# Patient Record
Sex: FEMALE | Race: WHITE | Employment: FULL TIME | URBAN - METROPOLITAN AREA
[De-identification: names, ages, dates, MRNs, and addresses within clinical notes are randomized per-mention and may not be internally consistent; named-entity substitution may affect disease eponyms.]

---

## 2017-01-06 LAB
HBSAG, EXTERNAL: NEGATIVE
HIV, EXTERNAL: NON REACTIVE
RUBELLA, EXTERNAL: NORMAL
TYPE, ABO & RH, EXTERNAL: NORMAL

## 2017-05-22 LAB
ANTIBODY SCREEN, EXTERNAL: NEGATIVE
T. PALLIDUM, EXTERNAL: NEGATIVE

## 2017-07-13 LAB — GRBS, EXTERNAL: NEGATIVE

## 2017-08-08 ENCOUNTER — ANESTHESIA (OUTPATIENT)
Dept: LABOR AND DELIVERY | Age: 42
End: 2017-08-08
Payer: COMMERCIAL

## 2017-08-08 ENCOUNTER — HOSPITAL ENCOUNTER (INPATIENT)
Age: 42
LOS: 3 days | Discharge: HOME | End: 2017-08-11
Attending: OBSTETRICS & GYNECOLOGY | Admitting: OBSTETRICS & GYNECOLOGY
Payer: COMMERCIAL

## 2017-08-08 ENCOUNTER — ANESTHESIA EVENT (OUTPATIENT)
Dept: LABOR AND DELIVERY | Age: 42
End: 2017-08-08
Payer: COMMERCIAL

## 2017-08-08 PROBLEM — O09.529 HIGH-RISK PREGNANCY IN MULTIGRAVIDA GREATER THAN 35 YEARS OF AGE, ANTEPARTUM: Status: ACTIVE | Noted: 2017-08-08

## 2017-08-08 LAB
ABO + RH BLD: NORMAL
BLOOD GROUP ANTIBODIES SERPL: NORMAL
ERYTHROCYTE [DISTWIDTH] IN BLOOD BY AUTOMATED COUNT: 12.7 % (ref 11.5–14.5)
HCT VFR BLD AUTO: 31.7 % (ref 35–47)
HGB BLD-MCNC: 11.4 G/DL (ref 11.5–16)
MCH RBC QN AUTO: 30.9 PG (ref 26–34)
MCHC RBC AUTO-ENTMCNC: 36 G/DL (ref 30–36.5)
MCV RBC AUTO: 85.9 FL (ref 80–99)
PLATELET # BLD AUTO: 232 K/UL (ref 150–400)
RBC # BLD AUTO: 3.69 M/UL (ref 3.8–5.2)
SPECIMEN EXP DATE BLD: NORMAL
WBC # BLD AUTO: 4.2 K/UL (ref 3.6–11)

## 2017-08-08 PROCEDURE — 36415 COLL VENOUS BLD VENIPUNCTURE: CPT | Performed by: OBSTETRICS & GYNECOLOGY

## 2017-08-08 PROCEDURE — 74011000250 HC RX REV CODE- 250: Performed by: ANESTHESIOLOGY

## 2017-08-08 PROCEDURE — 74011000250 HC RX REV CODE- 250: Performed by: OBSTETRICS & GYNECOLOGY

## 2017-08-08 PROCEDURE — 3E0R3CZ INTRODUCE REGIONAL ANESTH IN SPINAL CANAL, PERC: ICD-10-PCS | Performed by: ANESTHESIOLOGY

## 2017-08-08 PROCEDURE — 76060000078 HC EPIDURAL ANESTHESIA: Performed by: OBSTETRICS & GYNECOLOGY

## 2017-08-08 PROCEDURE — 76010000392 HC C SECN EA ADDL 0.5 HR: Performed by: OBSTETRICS & GYNECOLOGY

## 2017-08-08 PROCEDURE — 86900 BLOOD TYPING SEROLOGIC ABO: CPT | Performed by: OBSTETRICS & GYNECOLOGY

## 2017-08-08 PROCEDURE — 74011250636 HC RX REV CODE- 250/636: Performed by: ANESTHESIOLOGY

## 2017-08-08 PROCEDURE — 77030012890

## 2017-08-08 PROCEDURE — 74011250636 HC RX REV CODE- 250/636: Performed by: OBSTETRICS & GYNECOLOGY

## 2017-08-08 PROCEDURE — 85027 COMPLETE CBC AUTOMATED: CPT | Performed by: OBSTETRICS & GYNECOLOGY

## 2017-08-08 PROCEDURE — 10907ZC DRAINAGE OF AMNIOTIC FLUID, THERAPEUTIC FROM PRODUCTS OF CONCEPTION, VIA NATURAL OR ARTIFICIAL OPENING: ICD-10-PCS | Performed by: OBSTETRICS & GYNECOLOGY

## 2017-08-08 PROCEDURE — 75410000003 HC RECOV DEL/VAG/CSECN EA 0.5 HR: Performed by: OBSTETRICS & GYNECOLOGY

## 2017-08-08 PROCEDURE — 77030020061 HC IV BLD WRMR ADMIN SET 3M -B: Performed by: ANESTHESIOLOGY

## 2017-08-08 PROCEDURE — 77030034849

## 2017-08-08 PROCEDURE — 00HU33Z INSERTION OF INFUSION DEVICE INTO SPINAL CANAL, PERCUTANEOUS APPROACH: ICD-10-PCS | Performed by: ANESTHESIOLOGY

## 2017-08-08 PROCEDURE — 74011000250 HC RX REV CODE- 250

## 2017-08-08 PROCEDURE — 65270000029 HC RM PRIVATE

## 2017-08-08 PROCEDURE — 77030014125 HC TY EPDRL BBMI -B: Performed by: ANESTHESIOLOGY

## 2017-08-08 PROCEDURE — 74011250637 HC RX REV CODE- 250/637: Performed by: OBSTETRICS & GYNECOLOGY

## 2017-08-08 PROCEDURE — 74011250636 HC RX REV CODE- 250/636

## 2017-08-08 PROCEDURE — A4300 CATH IMPL VASC ACCESS PORTAL: HCPCS

## 2017-08-08 PROCEDURE — 75410000002 HC LABOR FEE PER 1 HR

## 2017-08-08 PROCEDURE — 76010000391 HC C SECN FIRST 1 HR: Performed by: OBSTETRICS & GYNECOLOGY

## 2017-08-08 PROCEDURE — 3E033VJ INTRODUCTION OF OTHER HORMONE INTO PERIPHERAL VEIN, PERCUTANEOUS APPROACH: ICD-10-PCS | Performed by: OBSTETRICS & GYNECOLOGY

## 2017-08-08 PROCEDURE — 77030011943

## 2017-08-08 PROCEDURE — 77030007880 HC KT SPN EPDRL BBMI -B

## 2017-08-08 RX ORDER — SODIUM CHLORIDE, SODIUM LACTATE, POTASSIUM CHLORIDE, CALCIUM CHLORIDE 600; 310; 30; 20 MG/100ML; MG/100ML; MG/100ML; MG/100ML
125 INJECTION, SOLUTION INTRAVENOUS CONTINUOUS
Status: DISCONTINUED | OUTPATIENT
Start: 2017-08-08 | End: 2017-08-11 | Stop reason: HOSPADM

## 2017-08-08 RX ORDER — FENTANYL CITRATE 50 UG/ML
100 INJECTION, SOLUTION INTRAMUSCULAR; INTRAVENOUS ONCE
Status: DISPENSED | OUTPATIENT
Start: 2017-08-08 | End: 2017-08-08

## 2017-08-08 RX ORDER — NALBUPHINE HYDROCHLORIDE 10 MG/ML
5 INJECTION, SOLUTION INTRAMUSCULAR; INTRAVENOUS; SUBCUTANEOUS
Status: CANCELLED | OUTPATIENT
Start: 2017-08-08

## 2017-08-08 RX ORDER — NALOXONE HYDROCHLORIDE 0.4 MG/ML
0.4 INJECTION, SOLUTION INTRAMUSCULAR; INTRAVENOUS; SUBCUTANEOUS AS NEEDED
Status: DISCONTINUED | OUTPATIENT
Start: 2017-08-08 | End: 2017-08-08 | Stop reason: HOSPADM

## 2017-08-08 RX ORDER — LIDOCAINE HYDROCHLORIDE AND EPINEPHRINE 15; 5 MG/ML; UG/ML
INJECTION, SOLUTION EPIDURAL
Status: DISCONTINUED
Start: 2017-08-08 | End: 2017-08-09

## 2017-08-08 RX ORDER — NALOXONE HYDROCHLORIDE 0.4 MG/ML
0.4 INJECTION, SOLUTION INTRAMUSCULAR; INTRAVENOUS; SUBCUTANEOUS AS NEEDED
Status: CANCELLED | OUTPATIENT
Start: 2017-08-08

## 2017-08-08 RX ORDER — CALCIUM CARBONATE 200(500)MG
200 TABLET,CHEWABLE ORAL
Status: DISCONTINUED | OUTPATIENT
Start: 2017-08-08 | End: 2017-08-08

## 2017-08-08 RX ORDER — FENTANYL CITRATE 50 UG/ML
100 INJECTION, SOLUTION INTRAMUSCULAR; INTRAVENOUS
Status: DISCONTINUED | OUTPATIENT
Start: 2017-08-08 | End: 2017-08-08 | Stop reason: HOSPADM

## 2017-08-08 RX ORDER — ONDANSETRON 2 MG/ML
4 INJECTION INTRAMUSCULAR; INTRAVENOUS
Status: DISCONTINUED | OUTPATIENT
Start: 2017-08-08 | End: 2017-08-08 | Stop reason: HOSPADM

## 2017-08-08 RX ORDER — AMMONIA 15 % (W/V)
1 AMPUL (EA) INHALATION AS NEEDED
Status: DISCONTINUED | OUTPATIENT
Start: 2017-08-08 | End: 2017-08-11 | Stop reason: HOSPADM

## 2017-08-08 RX ORDER — FAMOTIDINE 10 MG/ML
20 INJECTION INTRAVENOUS
Status: DISCONTINUED | OUTPATIENT
Start: 2017-08-08 | End: 2017-08-09

## 2017-08-08 RX ORDER — MIDAZOLAM HYDROCHLORIDE 1 MG/ML
INJECTION, SOLUTION INTRAMUSCULAR; INTRAVENOUS AS NEEDED
Status: DISCONTINUED | OUTPATIENT
Start: 2017-08-08 | End: 2017-08-08 | Stop reason: HOSPADM

## 2017-08-08 RX ORDER — FENTANYL CITRATE 50 UG/ML
INJECTION, SOLUTION INTRAMUSCULAR; INTRAVENOUS AS NEEDED
Status: DISCONTINUED | OUTPATIENT
Start: 2017-08-08 | End: 2017-08-08 | Stop reason: HOSPADM

## 2017-08-08 RX ORDER — ACETAMINOPHEN 325 MG/1
650 TABLET ORAL
Status: DISCONTINUED | OUTPATIENT
Start: 2017-08-08 | End: 2017-08-09

## 2017-08-08 RX ORDER — FENTANYL/BUPIVACAINE/NS/PF 2-1250MCG
1-16 PREFILLED PUMP RESERVOIR EPIDURAL CONTINUOUS
Status: DISCONTINUED | OUTPATIENT
Start: 2017-08-08 | End: 2017-08-09

## 2017-08-08 RX ORDER — ACETAMINOPHEN 325 MG/1
650 TABLET ORAL
Status: DISCONTINUED | OUTPATIENT
Start: 2017-08-08 | End: 2017-08-11 | Stop reason: HOSPADM

## 2017-08-08 RX ORDER — NALBUPHINE HYDROCHLORIDE 10 MG/ML
2.5 INJECTION, SOLUTION INTRAMUSCULAR; INTRAVENOUS; SUBCUTANEOUS
Status: CANCELLED | OUTPATIENT
Start: 2017-08-08

## 2017-08-08 RX ORDER — MINERAL OIL
OIL (ML) ORAL
Status: DISCONTINUED
Start: 2017-08-08 | End: 2017-08-09

## 2017-08-08 RX ORDER — ONDANSETRON 2 MG/ML
INJECTION INTRAMUSCULAR; INTRAVENOUS AS NEEDED
Status: DISCONTINUED | OUTPATIENT
Start: 2017-08-08 | End: 2017-08-08 | Stop reason: HOSPADM

## 2017-08-08 RX ORDER — PHENYLEPHRINE 10 MG/250 ML(40 MCG/ML)IN 0.9 % SOD.CHLORIDE INTRAVENOUS
Status: DISCONTINUED
Start: 2017-08-08 | End: 2017-08-09

## 2017-08-08 RX ORDER — KETOROLAC TROMETHAMINE 30 MG/ML
30 INJECTION, SOLUTION INTRAMUSCULAR; INTRAVENOUS
Status: CANCELLED | OUTPATIENT
Start: 2017-08-08 | End: 2017-08-09

## 2017-08-08 RX ORDER — TRISODIUM CITRATE DIHYDRATE AND CITRIC ACID MONOHYDRATE 500; 334 MG/5ML; MG/5ML
30 SOLUTION ORAL
Status: DISCONTINUED | OUTPATIENT
Start: 2017-08-08 | End: 2017-08-09

## 2017-08-08 RX ORDER — OXYTOCIN/RINGER'S LACTATE 20/1000 ML
PLASTIC BAG, INJECTION (ML) INTRAVENOUS
Status: DISCONTINUED | OUTPATIENT
Start: 2017-08-08 | End: 2017-08-08 | Stop reason: HOSPADM

## 2017-08-08 RX ORDER — SODIUM CHLORIDE 9 MG/ML
INJECTION INTRAMUSCULAR; INTRAVENOUS; SUBCUTANEOUS
Status: COMPLETED
Start: 2017-08-08 | End: 2017-08-08

## 2017-08-08 RX ORDER — OXYCODONE AND ACETAMINOPHEN 5; 325 MG/1; MG/1
1 TABLET ORAL
Status: DISCONTINUED | OUTPATIENT
Start: 2017-08-08 | End: 2017-08-11 | Stop reason: HOSPADM

## 2017-08-08 RX ORDER — BUPIVACAINE HYDROCHLORIDE 5 MG/ML
30 INJECTION, SOLUTION EPIDURAL; INTRACAUDAL AS NEEDED
Status: DISCONTINUED | OUTPATIENT
Start: 2017-08-08 | End: 2017-08-08 | Stop reason: HOSPADM

## 2017-08-08 RX ORDER — MORPHINE SULFATE 0.5 MG/ML
INJECTION, SOLUTION EPIDURAL; INTRATHECAL; INTRAVENOUS AS NEEDED
Status: DISCONTINUED | OUTPATIENT
Start: 2017-08-08 | End: 2017-08-08 | Stop reason: HOSPADM

## 2017-08-08 RX ORDER — HYDROCORTISONE ACETATE PRAMOXINE HCL 2.5; 1 G/100G; G/100G
4 CREAM TOPICAL AS NEEDED
Status: DISCONTINUED | OUTPATIENT
Start: 2017-08-08 | End: 2017-08-11 | Stop reason: HOSPADM

## 2017-08-08 RX ORDER — SODIUM CHLORIDE, SODIUM LACTATE, POTASSIUM CHLORIDE, CALCIUM CHLORIDE 600; 310; 30; 20 MG/100ML; MG/100ML; MG/100ML; MG/100ML
INJECTION, SOLUTION INTRAVENOUS
Status: DISCONTINUED | OUTPATIENT
Start: 2017-08-08 | End: 2017-08-08 | Stop reason: HOSPADM

## 2017-08-08 RX ORDER — BUPIVACAINE HYDROCHLORIDE 5 MG/ML
INJECTION, SOLUTION EPIDURAL; INTRACAUDAL AS NEEDED
Status: DISCONTINUED | OUTPATIENT
Start: 2017-08-08 | End: 2017-08-08 | Stop reason: HOSPADM

## 2017-08-08 RX ORDER — CEFAZOLIN SODIUM IN 0.9 % NACL 2 G/50 ML
2 INTRAVENOUS SOLUTION, PIGGYBACK (ML) INTRAVENOUS ONCE
Status: COMPLETED | OUTPATIENT
Start: 2017-08-08 | End: 2017-08-08

## 2017-08-08 RX ORDER — OXYCODONE AND ACETAMINOPHEN 5; 325 MG/1; MG/1
2 TABLET ORAL
Status: DISCONTINUED | OUTPATIENT
Start: 2017-08-08 | End: 2017-08-11 | Stop reason: HOSPADM

## 2017-08-08 RX ORDER — DOCUSATE SODIUM 100 MG/1
100 CAPSULE, LIQUID FILLED ORAL
Status: DISCONTINUED | OUTPATIENT
Start: 2017-08-08 | End: 2017-08-11 | Stop reason: HOSPADM

## 2017-08-08 RX ORDER — SIMETHICONE 80 MG
80 TABLET,CHEWABLE ORAL
Status: DISCONTINUED | OUTPATIENT
Start: 2017-08-08 | End: 2017-08-11 | Stop reason: HOSPADM

## 2017-08-08 RX ORDER — OXYTOCIN IN 5 % DEXTROSE 30/500 ML
1-25 PLASTIC BAG, INJECTION (ML) INTRAVENOUS
Status: DISCONTINUED | OUTPATIENT
Start: 2017-08-08 | End: 2017-08-08 | Stop reason: HOSPADM

## 2017-08-08 RX ORDER — OXYTOCIN/RINGER'S LACTATE 20/1000 ML
PLASTIC BAG, INJECTION (ML) INTRAVENOUS
Status: DISCONTINUED
Start: 2017-08-08 | End: 2017-08-09

## 2017-08-08 RX ORDER — ACETAMINOPHEN 10 MG/ML
INJECTION, SOLUTION INTRAVENOUS AS NEEDED
Status: DISCONTINUED | OUTPATIENT
Start: 2017-08-08 | End: 2017-08-08 | Stop reason: HOSPADM

## 2017-08-08 RX ORDER — CALCIUM CARBONATE 200(500)MG
200 TABLET,CHEWABLE ORAL
Status: DISCONTINUED | OUTPATIENT
Start: 2017-08-08 | End: 2017-08-09

## 2017-08-08 RX ORDER — TERBUTALINE SULFATE 1 MG/ML
0.25 INJECTION SUBCUTANEOUS AS NEEDED
Status: DISCONTINUED | OUTPATIENT
Start: 2017-08-08 | End: 2017-08-08 | Stop reason: HOSPADM

## 2017-08-08 RX ORDER — ONDANSETRON 2 MG/ML
4 INJECTION INTRAMUSCULAR; INTRAVENOUS
Status: DISCONTINUED | OUTPATIENT
Start: 2017-08-08 | End: 2017-08-09 | Stop reason: SDUPTHER

## 2017-08-08 RX ORDER — SODIUM CHLORIDE 0.9 % (FLUSH) 0.9 %
5-10 SYRINGE (ML) INJECTION EVERY 8 HOURS
Status: DISCONTINUED | OUTPATIENT
Start: 2017-08-08 | End: 2017-08-11 | Stop reason: HOSPADM

## 2017-08-08 RX ORDER — MORPHINE SULFATE 8 MG/ML
6 INJECTION, SOLUTION INTRAMUSCULAR; INTRAVENOUS
Status: CANCELLED | OUTPATIENT
Start: 2017-08-08

## 2017-08-08 RX ORDER — DIPHENHYDRAMINE HYDROCHLORIDE 50 MG/ML
12.5 INJECTION, SOLUTION INTRAMUSCULAR; INTRAVENOUS
Status: DISCONTINUED | OUTPATIENT
Start: 2017-08-08 | End: 2017-08-11 | Stop reason: HOSPADM

## 2017-08-08 RX ORDER — IBUPROFEN 600 MG/1
600 TABLET ORAL
Status: DISCONTINUED | OUTPATIENT
Start: 2017-08-08 | End: 2017-08-11 | Stop reason: HOSPADM

## 2017-08-08 RX ORDER — DEXAMETHASONE SODIUM PHOSPHATE 4 MG/ML
INJECTION, SOLUTION INTRA-ARTICULAR; INTRALESIONAL; INTRAMUSCULAR; INTRAVENOUS; SOFT TISSUE AS NEEDED
Status: DISCONTINUED | OUTPATIENT
Start: 2017-08-08 | End: 2017-08-08 | Stop reason: HOSPADM

## 2017-08-08 RX ORDER — SODIUM CHLORIDE, SODIUM LACTATE, POTASSIUM CHLORIDE, CALCIUM CHLORIDE 600; 310; 30; 20 MG/100ML; MG/100ML; MG/100ML; MG/100ML
125 INJECTION, SOLUTION INTRAVENOUS CONTINUOUS
Status: DISCONTINUED | OUTPATIENT
Start: 2017-08-08 | End: 2017-08-09

## 2017-08-08 RX ORDER — ONDANSETRON 2 MG/ML
4 INJECTION INTRAMUSCULAR; INTRAVENOUS
Status: CANCELLED | OUTPATIENT
Start: 2017-08-08

## 2017-08-08 RX ORDER — HYDROCORTISONE 1 %
CREAM (GRAM) TOPICAL AS NEEDED
Status: DISCONTINUED | OUTPATIENT
Start: 2017-08-08 | End: 2017-08-11 | Stop reason: HOSPADM

## 2017-08-08 RX ORDER — NALBUPHINE HYDROCHLORIDE 10 MG/ML
10 INJECTION, SOLUTION INTRAMUSCULAR; INTRAVENOUS; SUBCUTANEOUS
Status: DISCONTINUED | OUTPATIENT
Start: 2017-08-08 | End: 2017-08-08 | Stop reason: HOSPADM

## 2017-08-08 RX ORDER — SODIUM CHLORIDE 0.9 % (FLUSH) 0.9 %
5-10 SYRINGE (ML) INJECTION AS NEEDED
Status: DISCONTINUED | OUTPATIENT
Start: 2017-08-08 | End: 2017-08-11 | Stop reason: HOSPADM

## 2017-08-08 RX ORDER — OXYTOCIN/RINGER'S LACTATE 20/1000 ML
125-1000 PLASTIC BAG, INJECTION (ML) INTRAVENOUS AS NEEDED
Status: DISCONTINUED | OUTPATIENT
Start: 2017-08-08 | End: 2017-08-11 | Stop reason: HOSPADM

## 2017-08-08 RX ORDER — RANITIDINE HCL 75 MG
150 TABLET ORAL 2 TIMES DAILY
COMMUNITY
End: 2017-08-11

## 2017-08-08 RX ORDER — MORPHINE SULFATE 10 MG/ML
10 INJECTION, SOLUTION INTRAMUSCULAR; INTRAVENOUS
Status: CANCELLED | OUTPATIENT
Start: 2017-08-08

## 2017-08-08 RX ORDER — SODIUM CHLORIDE TAB 1 GM 1 G
1 TAB MISCELLANEOUS DAILY
COMMUNITY
End: 2017-08-11

## 2017-08-08 RX ORDER — LIDOCAINE HYDROCHLORIDE AND EPINEPHRINE 15; 5 MG/ML; UG/ML
INJECTION, SOLUTION EPIDURAL AS NEEDED
Status: DISCONTINUED | OUTPATIENT
Start: 2017-08-08 | End: 2017-08-08 | Stop reason: HOSPADM

## 2017-08-08 RX ADMIN — Medication: at 21:56

## 2017-08-08 RX ADMIN — AZITHROMYCIN MONOHYDRATE 500 MG: 500 INJECTION, POWDER, LYOPHILIZED, FOR SOLUTION INTRAVENOUS at 21:20

## 2017-08-08 RX ADMIN — ONDANSETRON 4 MG: 2 INJECTION INTRAMUSCULAR; INTRAVENOUS at 20:14

## 2017-08-08 RX ADMIN — CALCIUM CARBONATE (ANTACID) CHEW TAB 500 MG 200 MG: 500 CHEW TAB at 15:59

## 2017-08-08 RX ADMIN — DEXAMETHASONE SODIUM PHOSPHATE 8 MG: 4 INJECTION, SOLUTION INTRA-ARTICULAR; INTRALESIONAL; INTRAMUSCULAR; INTRAVENOUS; SOFT TISSUE at 21:48

## 2017-08-08 RX ADMIN — CEFAZOLIN 2 G: 1 INJECTION, POWDER, FOR SOLUTION INTRAMUSCULAR; INTRAVENOUS; PARENTERAL at 20:55

## 2017-08-08 RX ADMIN — CALCIUM CARBONATE (ANTACID) CHEW TAB 500 MG 200 MG: 500 CHEW TAB at 19:04

## 2017-08-08 RX ADMIN — ACETAMINOPHEN 1000 MG: 10 INJECTION, SOLUTION INTRAVENOUS at 22:06

## 2017-08-08 RX ADMIN — ONDANSETRON 4 MG: 2 INJECTION INTRAMUSCULAR; INTRAVENOUS at 21:48

## 2017-08-08 RX ADMIN — SODIUM CHLORIDE, SODIUM LACTATE, POTASSIUM CHLORIDE, CALCIUM CHLORIDE: 600; 310; 30; 20 INJECTION, SOLUTION INTRAVENOUS at 21:48

## 2017-08-08 RX ADMIN — SODIUM CHLORIDE, SODIUM LACTATE, POTASSIUM CHLORIDE, AND CALCIUM CHLORIDE 1000 ML: 600; 310; 30; 20 INJECTION, SOLUTION INTRAVENOUS at 23:18

## 2017-08-08 RX ADMIN — LIDOCAINE HYDROCHLORIDE AND EPINEPHRINE 5 ML: 15; 5 INJECTION, SOLUTION EPIDURAL at 21:26

## 2017-08-08 RX ADMIN — MIDAZOLAM HYDROCHLORIDE 2 MG: 1 INJECTION, SOLUTION INTRAMUSCULAR; INTRAVENOUS at 22:01

## 2017-08-08 RX ADMIN — FENTANYL CITRATE 100 MCG: 50 INJECTION, SOLUTION INTRAMUSCULAR; INTRAVENOUS at 15:29

## 2017-08-08 RX ADMIN — SODIUM CHLORIDE, SODIUM LACTATE, POTASSIUM CHLORIDE, CALCIUM CHLORIDE: 600; 310; 30; 20 INJECTION, SOLUTION INTRAVENOUS at 21:51

## 2017-08-08 RX ADMIN — EPHEDRINE SULFATE 12.5 MG: 50 INJECTION INTRAMUSCULAR; INTRAVENOUS; SUBCUTANEOUS at 15:48

## 2017-08-08 RX ADMIN — BUPIVACAINE HYDROCHLORIDE 2 ML: 5 INJECTION, SOLUTION EPIDURAL; INTRACAUDAL at 15:29

## 2017-08-08 RX ADMIN — LIDOCAINE HYDROCHLORIDE AND EPINEPHRINE 5 ML: 15; 5 INJECTION, SOLUTION EPIDURAL at 21:22

## 2017-08-08 RX ADMIN — FENTANYL 0.2 MG/100ML-BUPIV 0.125%-NACL 0.9% EPIDURAL INJ 10 ML/HR: 2/0.125 SOLUTION at 15:25

## 2017-08-08 RX ADMIN — SODIUM CHLORIDE, SODIUM LACTATE, POTASSIUM CHLORIDE, AND CALCIUM CHLORIDE 999 ML/HR: 600; 310; 30; 20 INJECTION, SOLUTION INTRAVENOUS at 14:34

## 2017-08-08 RX ADMIN — SODIUM CHLORIDE 10 ML: 9 INJECTION INTRAMUSCULAR; INTRAVENOUS; SUBCUTANEOUS at 21:13

## 2017-08-08 RX ADMIN — LIDOCAINE HYDROCHLORIDE AND EPINEPHRINE 5 ML: 15; 5 INJECTION, SOLUTION EPIDURAL at 15:29

## 2017-08-08 RX ADMIN — MORPHINE SULFATE 3 MG: 0.5 INJECTION, SOLUTION EPIDURAL; INTRATHECAL; INTRAVENOUS at 22:43

## 2017-08-08 RX ADMIN — FAMOTIDINE 20 MG: 10 INJECTION INTRAVENOUS at 21:13

## 2017-08-08 RX ADMIN — Medication 2 MILLI-UNITS/MIN: at 07:20

## 2017-08-08 RX ADMIN — LIDOCAINE HYDROCHLORIDE AND EPINEPHRINE 5 ML: 15; 5 INJECTION, SOLUTION EPIDURAL at 21:24

## 2017-08-08 RX ADMIN — CALCIUM CARBONATE (ANTACID) CHEW TAB 500 MG 200 MG: 500 CHEW TAB at 09:57

## 2017-08-08 RX ADMIN — FAMOTIDINE 20 MG: 10 INJECTION INTRAVENOUS at 09:21

## 2017-08-08 RX ADMIN — SODIUM CHLORIDE, SODIUM LACTATE, POTASSIUM CHLORIDE, AND CALCIUM CHLORIDE 125 ML/HR: 600; 310; 30; 20 INJECTION, SOLUTION INTRAVENOUS at 07:00

## 2017-08-08 NOTE — PROGRESS NOTES
8787 - Pt and  (Rayo Alegria) to LDR Rm 9 for scheduled induction for POTTSyndrome (orthostatic hypotension). 0730 - Teaching done re: Induction of labor, epidural, breastfeeding.

## 2017-08-08 NOTE — PROGRESS NOTES
In to see patient and check cervix  10cm/100%/-1 OP  Patient put on hands and knees - will have her labor down and reassess in 20-30 min  Early decels noted, otherwise reassuring fetal surveillance

## 2017-08-08 NOTE — PROGRESS NOTES
1943: Bedside and Verbal shift change report given to LANIE Khan RN (oncoming nurse) by CITLALLI Odom (offgoing nurse). Report included the following information SBAR, Kardex, Procedure Summary, Intake/Output, MAR, Accordion and Recent Results. 0130: TRANSFER - OUT REPORT:    Verbal report given to ERICA Ordonez RN(name) on [facility]  being transferred to room 324 on MIU(unit) for routine progression of care       Report consisted of patients Situation, Background, Assessment and   Recommendations(SBAR). Information from the following report(s) SBAR, Kardex, OR Summary, Procedure Summary, Intake/Output, MAR, Accordion and Recent Results was reviewed with the receiving nurse. Lines:   Peripheral IV 08/08/17 Left Forearm (Active)        Opportunity for questions and clarification was provided.       Patient transported with:   Registered Nurse

## 2017-08-08 NOTE — PROGRESS NOTES
0745: Bedside and Verbal shift change report given to CITLALLI Noel RN (oncoming nurse) by Rosy Lancaster RN (offgoing nurse). Report included the following information SBAR, Intake/Output and MAR.     0830: Patient complaining of HA. Will get order from Dr. Betzy Obrien for Tylenol. 0840: Order received for Tylenol. 1013: Patient has taken home medication already. Dr. Betzy Obrien aware. 0900: Dr. Betzy Obrien at bedside to discuss plan of care. 3533: Patient up walking in franks. 1006: Patient sitting on birthing ball. 1025: Dr. Betzy Obrien at bedside. SVE 4/50/-3. AROM at this time for moderate amount of clear fluid. 1434: Patient requesting epidural. IV fluid bolus started. 1440: Dr. Betzy Obrien at bedside. SVE 4/50/-3.   1523: Dr. Fermin Hinkle at bedside to place epidural.  1548: Ephedrine given. 1649: Straight cath for 150 cc.   1651: Dr. Betzy Obrien at bedside. SVE 10/100/-1.   1656: Knee chest.   1759: Dr. Betzy Obrien at bedside. SVE 10/100/0. OP.   1803: Start pushing. 1915: Spinning babies. 1926: Resume pushing. Dr. Betzy Obrien at bedside. 10/100/+1. Still OP.

## 2017-08-08 NOTE — H&P
EDC:2017  EGA: 38 weeks, 3 days      Vital Signs   40Years Old Female  Weight: 156 pounds  BP:       108/70    Pap/HPV/Gardasil History   History of abnormal pap: yes  Gardasil Injection History: Not Applicable    Patient's Prenatal Care with Doctor of Edy Foster MD Notable For -    L ventriculomegaly, choroid plexus cyst-persistent->imaging in  period  High risk pregnancy AMA multigravida-TbssshrI68 Neg, NT u/s neg, growth q4, weekly surv 36wks  Anemia in pregnancy 10.1, on Fe  Marginal placenta previa w/o hemorrhage f/u -resolved  Advanced paternal age  G1 T21 s/p D&E  Previous pregnancy w/ PTL                  Allergies    * NITRATES (Moderate)      Medications Removed from Medication List        [address] for Follow-up Visit     Estimated weeks of        gestation:  45 3/7     Weight:  156     Blood pressure: 108 / 70     Urine Protein:  N     Urine Glucose: N     Headache:  few     Nausea/vomiting: n/v occas     Edema: TrLE     Vaginal bleeding: no     Vaginal discharge: plug     Fetal activity:  yes     Fundal height:    38     FHR:   nst/us     Labor symptoms: bhc     Fetal position:  cephalic     Cx Dilation:  3     Cx Effacement: 50%     Cx Station:  -4     Next visit:  1 wk     Preceptor:  blt     Comment:  lots of fetal movement. ultrasound to follow. Feels ready. IOL scheduled for Aug 8-plan pitocin. Lost her mucus plug and had some blood tinged mucus. Fetal Surveillance      NST Fetus A  An NST was performed and was reactive. The baseline FHR was 130 and regular. Moderate variablity was present. Accelerations of sufficient amplitude and duration were noted. There were no decelerations noted. Tocometry: irritability, no contractions.        Impression & Recommendations:    Problem # 1:  High risk pregnancy AMA multigravida-IbrxsdeK48 Neg, NT u/s neg, growth q4, weekly surv 36wks (ICD-V23.82) (LQM63-V67.523)  normal antepartum visit  plan IOL with pitocin   group b streptococcus negative    Orders:  Fetal non-stress test (CPT-11165S)  Antepartum Care (CPT-10)  6 Wks Post Partum Visit (xxxx)      Problem # 2:  L ventriculomegaly, choroid plexus cyst-persistent->imaging in  period (ICD-655.00) (QUN54-Y45.0xx0)  will notify peds    Problem # 3:  Anemia in pregnancy 10.1, on Fe (ICD-648.20) (OSN00-N74.019)  check CBC on admission      Medications (at conclusion of this visit)    2017 * BREAST PUMP DIAGNOSIS CODE 676.80 EDC:  2017 SLOW  (45 FE) MG ORAL CR-TABS (FERROUS SULFATE)   2017 NYSTATIN-TRIAMCINOLONE 159882-4.1 UNIT/GM-% CREA (NYSTATIN-TRIAMCINOLONE) apply to affected area three times daily as needed for pruritis for up to one week  2017 VITAMIN D3 1000 UNIT ORAL CAPS (CHOLECALCIFEROL)   2017 BENADRYL DYE-FREE ALLERGY CAPS (DIPHENHYDRAMINE HCL CAPS)   2017 BRUNO-SELTZER HEARTBURN + -80 MG ORAL CHEW (CALCIUM CARBONATE-SIMETHICONE)   2017 ZANTAC 150 MAXIMUM STRENGTH 150 MG ORAL TABS (RANITIDINE HCL)   2017 COLACE 100 MG ORAL CAPS (DOCUSATE SODIUM)   2016 PRENATAL PLUS 27-1 MG TABS (PRENATAL VIT-FE FUMARATE-FA) 1 po daily  2015 TYLENOL TABS (ACETAMINOPHEN TABS) Over the counter medication          Primary Provider:  Yadira Bryant MD    CC:  induction. History of Present Illness:  43yo [id number] scheduled for induction of labor .         Past Medical History:     Reviewed history from 2017 and no changes required:        Orthostatic hypotension        Tachycardia    Past Surgical History:     Reviewed history from 10/20/2015 and no changes required:        Cosmetic Surgery- rhinoplasty        Dilatation & Curettage        MAB        Vaginal Delivery        [id number]  D&E     Family History Summary:      Reviewed history Last on 2017 and no changes required:2017      General Comments - FH:  Family history transferred to [address] [place] [address] History:     Reviewed history from 2017 and no changes required:                [employer]        1 son \"Javan\"                 Smoking History:        Patient has never smoked. Previous Tobacco Use: Signed On - 2017  Smoked Tobacco Use:  Never smoker  Smokeless Tobacco Use:  Never  Passive smoke exposure:  no  Drug use:  no  HIV high-risk behavior:  no  Caffeine use:  tea on occas. drinks per day    Previous Alcohol Use: Signed On - 2017  Alcohol use:  no  Exercise:  no  Seatbelt use:  100 %  Sun Exposure:  rarely    Mammogram History:     Date of Last Mammogram:  2016    PAP Smear History:     Date of Last PAP Smear:  2016      Review of Systems        See HPI    Except as noted in the HPI, the review of systems is negative for General, Breast, , Resp, GI, Endo, MS, Psych and Heme.       Vital Signs    Blood Pressure: 108 / 70  NST:   reactive     Weight:  156 pounds      Physical Exam     General           General appearance:  NAD, resting comfortably    Chest           Lungs:  no resp distress    Psych           Orientation:  oriented to time, place, and person    Abdomen           Abdomen:  soft, gravid, NT          Fundal Height:  38    Pelvic Exam           EGBUS:  no lesions                  Dilation: : 3                  Effacement:  50%                  Station:  -4                  Presentation:  cephalic    Allergies    * NITRATES (Moderate)      Medications Removed from Medication List        Impression & Recommendations:    Problem # 1:  High risk pregnancy AMA multigravida-QawgffgL53 Neg, NT u/s neg, growth q4, weekly surv 36wks (ICD-V23.82) (TDJ22-D16.523)  normal antepartum visit  plan IOL with pitocin   group b streptococcus negative    Orders:  Fetal non-stress test (CPT-40748L)  Antepartum Care (CPT-10)  6 Wks Post Partum Visit (xxxx)      Problem # 2:  L ventriculomegaly, choroid plexus cyst-persistent->imaging in  period (ICD-655.00) (ILC54-V22.0xx0)  will notify peds    Problem # 3:  Anemia in pregnancy 10.1, on Fe (ICD-648.20) (KCG24-H30.019)  check CBC on admission      Medications (at conclusion of this visit)    07/03/2017 * BREAST PUMP DIAGNOSIS CODE 676.80 EDC:  8/13/17 06/09/2017 SLOW  (45 FE) MG ORAL CR-TABS (FERROUS SULFATE)   05/22/2017 NYSTATIN-TRIAMCINOLONE 358888-2.1 UNIT/GM-% CREA (NYSTATIN-TRIAMCINOLONE) apply to affected area three times daily as needed for pruritis for up to one week  04/24/2017 VITAMIN D3 1000 UNIT ORAL CAPS (CHOLECALCIFEROL)   01/30/2017 BENADRYL DYE-FREE ALLERGY CAPS (DIPHENHYDRAMINE HCL CAPS)   01/30/2017 BRUNO-SELTZER HEARTBURN + -80 MG ORAL CHEW (CALCIUM CARBONATE-SIMETHICONE)   01/30/2017 ZANTAC 150 MAXIMUM STRENGTH 150 MG ORAL TABS (RANITIDINE HCL)   01/30/2017 COLACE 100 MG ORAL CAPS (DOCUSATE SODIUM)   01/28/2016 PRENATAL PLUS 27-1 MG TABS (PRENATAL VIT-FE FUMARATE-FA) 1 po daily  09/16/2015 TYLENOL TABS (ACETAMINOPHEN TABS) Over the counter medication          LABORATORY DATA   TEST DATE RESULT   Group B Strep culture 07/13/2017 Negative                                   (Group B Strep Culture Result Field)   Blood Type 01/06/2017 A                                             (Blood Type Result Field)   Rh 01/06/2017 Positive                                   (Rh Result Field)   Rhogam Inj Given 10/20/2015 *   Tdap Vaccine Given 05/22/2017 Vacc.  [address]   Antibody Screen 05/22/2017 Negative   Rubella  [facility] Reference Ranges On or After 3/10/14                  <0.90              Non-immune      0.90 - 0.99     Equivocal      >0.99              Immune    [facility] Reference Ranges  Before 3/10/14           <5                 Non-immune             5 - 9               Equivocal            >9                 Immune  [facility] Reference Ranges       < Or = 0.90       Negative             0.91-1.09          Equivocal            > Or = 1.10       Positive   01/06/2017     4.77     TPA (T Pallidum Antibodies) 05/22/2017 Negative   Serology (RPR) 10/20/2015 *   HBsAg 01/06/2017 Negative   HIV 01/06/2017 Non Reactive   Hemoglobin 05/22/2017 10.1   Hematocrit 05/22/2017 29.7   Platelets 05/14/3524 237 X10E3/UL   TSH 10/20/2015 *   Urine Culture 01/06/2017 Negative   GC DNA Probe 01/06/2017 Negative   Chlamydia DNA 01/06/2017 Negative   PAP 01/28/2016 RNIL   Flu Vaccine Given 10/20/2015 *   HGBA1C 10/20/2015 *   HGB Electro     T4, Free 10/20/2015 *   BG Fasting 10/20/2015 *   GTT 1H 50G 05/22/2017 111   GTT 1H 100G 10/20/2015 *   GTT 2H 100G 10/20/2015 *   GTT 3H 100G 10/20/2015 *   Glucose Plasma 10/20/2015 *   CF Accept or Decline 01/26/2017 declined   CF Screen Result     Nuchal Trans 03/27/2017 5.92^5. 92 mm&millimeters   AFP Only 02/27/2017 *Screen Negative*   Tetra     AFP Serum 10/20/2015 *   [facility]S 01/26/2017 declined   AFP Amniotic 10/20/2015 *   Amnio Karyo 01/06/2017 declined   FISH 10/20/2015 *   GC Culture 10/20/2015 *   Chlamydia Cult 10/20/2015 *   Ureaplasma     Mycoplasma     WBC 01/06/2017 8.5 X10E3/UL   RBC 01/06/2017 4.00 X10E6/UL   MCV 01/06/2017 87   MCH 01/06/2017 29.8   MCHC RBC 01/06/2017 34.3     ULTRASOUND DATA   TEST DATE RESULT   Estimated Fetal Weight 07/18/2017 3552.13357450^3552 g&grams                                     Weight % 07/18/2017 96^96% %&percent                                                HERNESTO 07/27/2017 15.38^15.4 cm&centimeters                    BPP 07/27/2017 8^8 [n/a]&Not applicable   Cervical Length (mm)

## 2017-08-08 NOTE — ANESTHESIA PROCEDURE NOTES
Epidural Block    Start time: 8/8/2017 3:22 PM  End time: 8/8/2017 3:31 PM  Performed by: YAMILE Sanz  Authorized by: YAMILE Sanz     Pre-Procedure  Indication: labor epidural    Preanesthetic Checklist: patient identified, risks and benefits discussed, anesthesia consent, site marked, patient being monitored, timeout performed and anesthesia consent    Timeout Time: 15:22        Epidural:   Patient position:  Left lateral decubitus  Prep region:  Lumbar  Prep: Chlorhexidine    Location:  L2-3    Needle and Epidural Catheter:   Needle Type:  Tuohy  Needle Gauge:  17 G  Injection Technique:  Loss of resistance using air  Attempts:  1  Catheter Size:  19 G  Events: no blood with aspiration, no cerebrospinal fluid with aspiration, no paresthesia and negative aspiration test    Test Dose:  Bupivacaine 0.25% and negative    Assessment:   Catheter Secured:  Tegaderm and tape  Insertion:  Uncomplicated  Patient tolerance:  Patient tolerated the procedure well with no immediate complications

## 2017-08-08 NOTE — IP AVS SNAPSHOT
[address] 
[phone] Patient: Raquel Alcantara MRN: [id number] :1975 Current Discharge Medication List  
  
START taking these medications Dose & Instructions Dispensing Information Comments Morning Noon Evening Bedtime  
 docusate sodium 100 mg capsule Commonly known as:  Liyasarah Manley Your last dose was: Your next dose is:    
   
   
 Dose:  100 mg Take 1 Cap by mouth two (2) times a day. Quantity:  60 Cap Refills:  0  
     
   
   
   
  
 ibuprofen 600 mg tablet Commonly known as:  MOTRIN Your last dose was: Your next dose is:    
   
   
 Dose:  600 mg Take 1 Tab by mouth every six (6) hours as needed for Pain. Quantity:  40 Tab Refills:  0  
     
   
   
   
  
 oxyCODONE-acetaminophen 5-325 mg per tablet Commonly known as:  PERCOCET Your last dose was: Your next dose is:    
   
   
 Dose:  2 Tab Take 2 Tabs by mouth every four (4) hours as needed for Pain. Max Daily Amount: 12 Tabs. Quantity:  20 Tab Refills:  0 CONTINUE these medications which have NOT CHANGED Dose & Instructions Dispensing Information Comments Morning Noon Evening Bedtime PRENATAL PO Your last dose was: Your next dose is: Take  by mouth daily. Refills:  0  
     
   
   
   
  
 VITAMIN D3 2,000 unit Tab Generic drug:  cholecalciferol (vitamin D3) Your last dose was: Your next dose is:    
   
   
 Dose:  2000 Units Take 2,000 Units by mouth daily. Refills:  0 STOP taking these medications ALPRAZolam 0.25 mg tablet Commonly known as:  XANAX  
   
  
 fludrocortisone 0.1 mg tablet Commonly known as:  FLORINEF PriLOSEC 10 mg capsule Generic drug:  omeprazole  
   
  
 sodium chloride 1 gram tablet  
   
  
 valACYclovir 1 gram tablet Commonly known as:  VALTREX  
   
  
 ZANTAC 75 75 mg tablet Generic drug:  raNITIdine ASK your doctor about these medications Dose & Instructions Dispensing Information Comments Morning Noon Evening Bedtime SLOW  mg (45 mg iron) ER tablet Generic drug:  ferrous sulfate Your last dose was: Your next dose is: Take  by mouth Daily (before breakfast). Refills:  0 Where to Get Your Medications Information on where to get these meds will be given to you by the nurse or doctor. ! Ask your nurse or doctor about these medications  
  docusate sodium 100 mg capsule  
 ibuprofen 600 mg tablet  
 oxyCODONE-acetaminophen 5-325 mg per tablet

## 2017-08-08 NOTE — IP AVS SNAPSHOT
[address] 
[phone] Patient: Sary Carrizales MRN: [id number] :1975 You are allergic to the following No active allergies Recent Documentation Height Weight Breastfeeding? BMI OB Status Smoking Status 1.6 m 70.8 kg Unknown 27.63 kg/m2 Recent pregnancy Never Smoker Emergency Contacts Name Discharge Info Relation Home Work Mobile Jonah Patel  Spouse [3] [phone] About your hospitalization You were admitted on:  2017 You last received care in the:  [address] [facility] You were discharged on:  2017 Unit phone number:  [phone] Why you were hospitalized Your primary diagnosis was:  Not on File Your diagnoses also included:  High-Risk Pregnancy In Multigravida Greater Than 28Years Of Age, Antepartum, Single Delivery By  Providers Seen During Your Hospitalizations Provider Role Specialty Primary office phone Saranya Cardoso MD Attending Provider Obstetrics & Gynecology [phone] Your Primary Care Physician (PCP) Primary Care Physician Office Phone Office Fax Nayeli Monik [phone] Follow-up Information Follow up With Details Comments Contact Info Nette Marie MD On 2017 For discharge follow up at 9:15AM  [place] [address] [address] [facility] Internal Medicine [facility] 
[phone] Your Appointments   9:15 AM EDT ROUTINE CARE with Nette Marie MD  
[address] [place] [address] Internal Medicine [address] [facility]  
[phone] Current Discharge Medication List  
  
START taking these medications Dose & Instructions Dispensing Information Comments Morning Noon Evening Bedtime  
 docusate sodium 100 mg capsule Commonly known as:  Roxie Officer Your last dose was: Your next dose is:    
   
   
 Dose:  100 mg Take 1 Cap by mouth two (2) times a day. Quantity:  60 Cap Refills:  0  
     
   
   
   
  
 ibuprofen 600 mg tablet Commonly known as:  MOTRIN Your last dose was: Your next dose is:    
   
   
 Dose:  600 mg Take 1 Tab by mouth every six (6) hours as needed for Pain. Quantity:  40 Tab Refills:  0  
     
   
   
   
  
 oxyCODONE-acetaminophen 5-325 mg per tablet Commonly known as:  PERCOCET Your last dose was: Your next dose is:    
   
   
 Dose:  2 Tab Take 2 Tabs by mouth every four (4) hours as needed for Pain. Max Daily Amount: 12 Tabs. Quantity:  20 Tab Refills:  0 CONTINUE these medications which have NOT CHANGED Dose & Instructions Dispensing Information Comments Morning Noon Evening Bedtime PRENATAL PO Your last dose was: Your next dose is: Take  by mouth daily. Refills:  0  
     
   
   
   
  
 VITAMIN D3 2,000 unit Tab Generic drug:  cholecalciferol (vitamin D3) Your last dose was: Your next dose is:    
   
   
 Dose:  2000 Units Take 2,000 Units by mouth daily. Refills:  0 STOP taking these medications ALPRAZolam 0.25 mg tablet Commonly known as:  XANAX  
   
  
 fludrocortisone 0.1 mg tablet Commonly known as:  FLORINEF PriLOSEC 10 mg capsule Generic drug:  omeprazole  
   
  
 sodium chloride 1 gram tablet  
   
  
 valACYclovir 1 gram tablet Commonly known as:  VALTREX  
   
  
 ZANTAC 75 75 mg tablet Generic drug:  raNITIdine ASK your doctor about these medications Dose & Instructions Dispensing Information Comments Morning Noon Evening Bedtime SLOW  mg (45 mg iron) ER tablet Generic drug:  ferrous sulfate Your last dose was: Your next dose is: Take  by mouth Daily (before breakfast). Refills:  0 Where to Get Your Medications Information on where to get these meds will be given to you by the nurse or doctor. ! Ask your nurse or doctor about these medications  
  docusate sodium 100 mg capsule  
 ibuprofen 600 mg tablet  
 oxyCODONE-acetaminophen 5-325 mg per tablet Discharge Instructions POSTPARTUM DISCHARGE INSTRUCTIONS Name:  Oliverio Overall YOB: 1975 Admission Diagnosis:  High-risk pregnancy in multigravida greater than 28years of age, antepartum Discharge Diagnosis:   
Problem List as of 2017  Date Reviewed: 2017 Codes Class Noted - Resolved Single delivery by  ICD-10-CM: O82 
ICD-9-CM: 669.71  2017 - Present High-risk pregnancy in multigravida greater than 28years of age, antepartum ICD-10-CM: O09.529 ICD-9-CM: V23.82  2017 - Present Infertility, female ICD-10-CM: N97.9 ICD-9-CM: 628.9  Unknown - Present Anxiety ICD-10-CM: F41.9 ICD-9-CM: 300.00  Unknown - Present Attending Physician:  Porsha Carl MD 
 
Delivery Type:   Section: What to Expect at [facility] Your Recovery: A  section, or , is surgery to deliver your baby through a cut, called an incision that the doctor makes in your lower belly and uterus. You may have some pain in your lower belly and need pain medicine for 1 to 2 weeks. You can expect some vaginal bleeding for several weeks. You will probably need about 6 weeks to fully recover. It is important to take it easy while the incision is healing. Avoid heavy lifting, strenuous activities, or exercises that strain the belly muscles while you are recovering. Ask a family member or friend for help with housework, cooking, and shopping.  
This care sheet gives you a general idea about how long it will take for you to recover. But each person recovers at a different pace. Follow the steps below to get better as quickly as possible. How can you care for yourself at home? Activity · Rest when you feel tired. Getting enough sleep will help you recover. · Try to walk each day. Start by walking a little more than you did the day before. Bit by bit, increase the amount you walk. Walking boosts blood flow and helps prevent pneumonia, constipation, and blood clots. · Avoid strenuous activities, such as bicycle riding, jogging, weightlifting, and aerobic exercise, for 6 weeks or until your doctor says it is okay. · Until your doctor says it is okay, do not lift anything heavier than your baby. · Do not do sit-ups or other exercise that strain the belly muscles for 6 weeks or until your doctor says it is okay. · Hold a pillow over your incision when you cough or take deep breaths. This will support your belly and decrease your pain. · You may shower as usual. Pat the incision dry when you are done. · You will have some vaginal bleeding. Wear sanitary pads. Do not douche or use tampons until your doctor says it is okay. · Ask your doctor when you can drive again. · You will probably need to take at least 6 weeks off work. It depends on the type of work you do and how you feel. · Wait until you are healed (about 4 to 6 weeks) before you have sexual intercourse. Your doctor will tell you when it is okay to have sex. · Talk to your doctor about birth control. You can get pregnant even before your period returns. Also, you can get pregnant while you are breast-feeding. Incision care Your skin is your body's first line of defense against germs, but an incision site leaves an easy way for germs to enter your body. To prevent infection: · Clean your hands frequently and before and after changing any touching any dressings.  
 
· If you have strips of tape on the incision, leave the tape on for a week or until it falls off. · Look at your incision closely every day for any changes. Contact your doctor if you experience any signs of infection, such as fever or increased redness at the surgical site. · Wash the area daily with warm, soapy water, and pat it dry. Don't use hydrogen peroxide or alcohol, which can slow healing. You may cover the area with a gauze bandage if it weeps or rubs against clothing. Change the bandage every day. · Keep the area clean and dry. Diet · You can eat your normal diet. If your stomach is upset, try bland, low-fat foods like plain rice, broiled chicken, toast, and yogurt. · Drink plenty of fluids (unless your doctor tells you not to). · You may notice that your bowel movements are not regular right after your surgery. This is common. Try to avoid constipation and straining with bowel movements. You may want to take a fiber supplement every day. If you have not had a bowel movement after a couple of days, ask your doctor about taking a mild laxative. · If you are breast-feeding, do not drink any alcohol. Medicines · Take pain medicines exactly as directed. · If the doctor gave you a prescription medicine for pain, take it as prescribed. · If you are not taking a prescription pain medicine, ask your doctor if you can take an over-the-counter medicine such as acetaminophen (Tylenol), ibuprofen (Advil, Motrin), or naproxen (Aleve), for cramps. Read and follow all instructions on the label. Do not take aspirin, because it can cause more bleeding. Do not take acetaminophen (Tylenol) and other acetaminophen containing medications (i.e. Percocet) at the same time. · If you think your pain medicine is making you sick to your stomach: 
· Take your medicine after meals (unless your doctor has told you not to). · Ask your doctor for a different pain medicine. · If your doctor prescribed antibiotics, take them as directed.  Do not stop taking them just because you feel better. You need to take the full course of antibiotics. Mental Health · Many women get the \"baby blues\" during the first few days after childbirth. You may lose sleep, feel irritable, and cry easily. You may feel happy one minute and sad the next. Hormone changes are one cause of these emotional changes. Also, the demands of a new baby, along with visits from relatives or other family needs, add to a mother's stress. The \"baby blues\" often peak around the fourth day. Then they ease up in less than 2 weeks. · If your moodiness or anxiety lasts for more than 2 weeks, or if you feel like life is not worth living, you may have postpartum depression. This is different for each mother. Some mothers with serious depression may worry intensely about their infant's well-being. Others may feel distant from their child. Some mothers might even feel that they might harm their baby. A mother may have signs of paranoia, wondering if someone is watching her. · With all the changes in your life, you may not know if you are depressed. Pregnancy sometimes causes changes in how you feel that are similar to the symptoms of depression. · Symptoms of depression include: · Feeling sad or hopeless and losing interest in daily activities. These are the most common symptoms of depression. · Sleeping too much or not enough. · Feeling tired. You may feel as if you have no energy. · Eating too much or too little. · POSTPARTUM [employer] (PSI) offers a Warm line; Chat with the Expert phone sessions; Information and Articles about Pregnancy and Postpartum Mood Disorders; Comprehensive List of Free Support Groups; Knowledgeable local coordinators who will offer support, information, and resources; Guide to Resources on [employer]; Calendar of events in the  mood disorders community;  Latest News and Research; and [facility] Section for Access and Networking. Remember - You are not alone; You are not to blame; With help, you will be well. 4-601-156-PPD(4151). WWW. POSTPARTUM. NET · Writing or talking about death, such as writing suicide notes or talking about guns, knives, or pills. Keep the numbers for these national suicide hotlines: 0-956-737-TALK (1-910.162.9834) and 4-114-YMXCFZG (3-510.910.7934). If you or someone you know talks about suicide or feeling hopeless, get help right away. Other instructions · If you breast-feed your baby, you may be more comfortable while you are healing if you place the baby so that he or she is not resting on your belly. Try tucking your baby under your arm, with his or her body along the side you will be feeding on. Support your baby's upper body with your arm. With that hand you can control your baby's head to bring his or her mouth to your breast. This is sometimes called the football hold. Follow-up care is a key part of your treatment and safety. Be sure to make and go to all appointments, and call your doctor if you are having problems. It's also a good idea to know your test results and keep a list of the medicines you take. When should you call for help? Call 911 anytime you think you may need emergency care. For example, call if: 
· You are thinking of hurting yourself, your baby, or anyone else. · You passed out (lost consciousness). · You have symptoms of a blood clot in your lung (called a pulmonary embolism). These may include: 
· Sudden chest pain. · Trouble breathing. · Coughing up blood. Call your doctor now or seek immediate medical care if: 
 
· You have severe vaginal bleeding. · You are soaking through a pad each hour for 2 or more hours. · Your vaginal bleeding seems to be getting heavier or is still bright red 4 days after delivery. · You are dizzy or lightheaded, or you feel like you may faint. · You are vomiting or cannot keep fluids down. · You have a fever. · You have new or more belly pain. · You have loose stitches, or your incision comes open. · You have symptoms of infection, such as: 
· Increased pain, swelling, warmth, or redness. · Red streaks leading from the incision. · Pus draining from the incision. · A fever · You pass tissue (not just blood). · Your vaginal discharge smells bad. · Your belly feels tender or full and hard. · Your breasts are continuously painful or red. · You feel sad, anxious, or hopeless for more than a few days. · You have sudden, severe pain in your belly. · You have symptoms of a blood clot in your leg (called a deep vein thrombosis), such as: 
· Pain in your calf, back of the knee, thigh, or groin. · Redness and swelling in your leg or groin. · You have symptoms of preeclampsia, such as: 
· Sudden swelling of your face, hands, or feet. · New vision problems (such as dimness or blurring). · A severe headache. · Your blood pressure is higher than it should be or rises suddenly. · You have new nausea or vomiting. Watch closely for changes in your health, and be sure to contact your doctor if you have any problems. Additional Information:  Not applicable These are general instructions for a healthy lifestyle: No smoking/ No tobacco products/ Avoid exposure to second hand smoke Surgeon General's Warning:  Quitting smoking now greatly reduces serious risk to your health. Obesity, smoking, and sedentary lifestyle greatly increases your risk for illness A healthy diet, regular physical exercise & weight monitoring are important for maintaining a healthy lifestyle Recognize signs and symptoms of STROKE: 
 
F-face looks uneven A-arms unable to move or move unevenly S-speech slurred or non-existent T-time-call 911 as soon as signs and symptoms begin - DO NOT go  
    back to bed or wait to see if you get better - TIME IS BRAIN. I have had the opportunity to make my options or choices for discharge. I have received and understand these instructions. Discharge Orders None [facility] Announcement We are excited to announce that we are making your provider's discharge notes available to you in [facility]. You will see these notes when they are completed and signed by the physician that discharged you from your recent hospital stay. If you have any questions or concerns about any information you see in [facility], please call the Health Information Department where you were seen or reach out to your Primary Care Provider for more information about your plan of care. Introducing [facility]! Dear Ivett Garduno: Thank you for requesting a [facility]ua account. Our records indicate that you already have an active [facility] account. You can access your account anytime at [email or web address] Did you know that you can access your hospital and ER discharge instructions at any time in [facility]? You can also review all of your test results from your hospital stay or ER visit. Additional Information If you have questions, please visit the Frequently Asked Questions section of the [facility] website at [email or web address] Remember, [facility]ua is NOT to be used for urgent needs. For medical emergencies, dial 911. Now available from your iPhone and Android! General Information Please provide this summary of care documentation to your next provider. Patient Signature:  ____________________________________________________________ Date:  ____________________________________________________________  
  
Ralph Cart Provider Signature:  ____________________________________________________________ Date:  ____________________________________________________________

## 2017-08-08 NOTE — ANESTHESIA PREPROCEDURE EVALUATION
Anesthesia Evaluation    Physical Exam    Airway  Mallampati: II  TM Distance: > 6 cm  Neck ROM: normal range of motion   Mouth opening: Normal     Cardiovascular  Regular rate and rhythm,  S1 and S2 normal,  no murmur, click, rub, or gallop             Dental  No notable dental hx       Pulmonary  Breath sounds clear to auscultation               Abdominal  GI exam deferred       Other Findings            Anesthetic Plan    ASA: 1  Anesthesia type: epidural          Induction: Intravenous  Anesthetic plan and risks discussed with: Patient

## 2017-08-09 LAB
BASOPHILS # BLD AUTO: 0 K/UL (ref 0–0.1)
BASOPHILS # BLD: 0 % (ref 0–1)
DIFFERENTIAL METHOD BLD: ABNORMAL
EOSINOPHIL # BLD: 0 K/UL (ref 0–0.4)
EOSINOPHIL NFR BLD: 0 % (ref 0–7)
ERYTHROCYTE [DISTWIDTH] IN BLOOD BY AUTOMATED COUNT: 12.6 % (ref 11.5–14.5)
HCT VFR BLD AUTO: 27.3 % (ref 35–47)
HGB BLD-MCNC: 9.7 G/DL (ref 11.5–16)
LYMPHOCYTES # BLD AUTO: 2 % (ref 12–49)
LYMPHOCYTES # BLD: 0.3 K/UL (ref 0.8–3.5)
MCH RBC QN AUTO: 30.6 PG (ref 26–34)
MCHC RBC AUTO-ENTMCNC: 35.5 G/DL (ref 30–36.5)
MCV RBC AUTO: 86.1 FL (ref 80–99)
MONOCYTES # BLD: 0.3 K/UL (ref 0–1)
MONOCYTES NFR BLD AUTO: 2 % (ref 5–13)
NEUTS BAND NFR BLD MANUAL: 6 % (ref 0–6)
NEUTS SEG # BLD: 13.1 K/UL (ref 1.8–8)
NEUTS SEG NFR BLD AUTO: 90 % (ref 32–75)
PLATELET # BLD AUTO: 205 K/UL (ref 150–400)
RBC # BLD AUTO: 3.17 M/UL (ref 3.8–5.2)
RBC MORPH BLD: ABNORMAL
RBC MORPH BLD: ABNORMAL
WBC # BLD AUTO: 13.7 K/UL (ref 3.6–11)

## 2017-08-09 PROCEDURE — 74011250636 HC RX REV CODE- 250/636: Performed by: OBSTETRICS & GYNECOLOGY

## 2017-08-09 PROCEDURE — 77030032490 HC SLV COMPR SCD KNE COVD -B

## 2017-08-09 PROCEDURE — 77030011255 HC DSG AQUACEL AG BMS -A

## 2017-08-09 PROCEDURE — 77030002888 HC SUT CHRMC J&J -A

## 2017-08-09 PROCEDURE — 77030018836 HC SOL IRR NACL ICUM -A

## 2017-08-09 PROCEDURE — 65410000002 HC RM PRIVATE OB

## 2017-08-09 PROCEDURE — 77030002966 HC SUT PDS J&J -A

## 2017-08-09 PROCEDURE — 77030018846 HC SOL IRR STRL H20 ICUM -A

## 2017-08-09 PROCEDURE — 36415 COLL VENOUS BLD VENIPUNCTURE: CPT | Performed by: OBSTETRICS & GYNECOLOGY

## 2017-08-09 PROCEDURE — 77030002933 HC SUT MCRYL J&J -A

## 2017-08-09 PROCEDURE — 77030002974 HC SUT PLN J&J -A

## 2017-08-09 PROCEDURE — 74011250637 HC RX REV CODE- 250/637: Performed by: OBSTETRICS & GYNECOLOGY

## 2017-08-09 PROCEDURE — 85025 COMPLETE CBC W/AUTO DIFF WBC: CPT | Performed by: OBSTETRICS & GYNECOLOGY

## 2017-08-09 PROCEDURE — 77030011640 HC PAD GRND REM COVD -A

## 2017-08-09 PROCEDURE — 74011250636 HC RX REV CODE- 250/636: Performed by: ANESTHESIOLOGY

## 2017-08-09 PROCEDURE — 77030014007 HC SPNG HEMSTAT J&J -B

## 2017-08-09 PROCEDURE — 77030031139 HC SUT VCRL2 J&J -A

## 2017-08-09 RX ORDER — MORPHINE SULFATE 10 MG/ML
10 INJECTION, SOLUTION INTRAMUSCULAR; INTRAVENOUS
Status: DISCONTINUED | OUTPATIENT
Start: 2017-08-09 | End: 2017-08-11 | Stop reason: HOSPADM

## 2017-08-09 RX ORDER — NALBUPHINE HYDROCHLORIDE 10 MG/ML
5 INJECTION, SOLUTION INTRAMUSCULAR; INTRAVENOUS; SUBCUTANEOUS
Status: DISCONTINUED | OUTPATIENT
Start: 2017-08-09 | End: 2017-08-10

## 2017-08-09 RX ORDER — OXYTOCIN/RINGER'S LACTATE 20/1000 ML
125-1000 PLASTIC BAG, INJECTION (ML) INTRAVENOUS
Status: DISCONTINUED | OUTPATIENT
Start: 2017-08-09 | End: 2017-08-11 | Stop reason: HOSPADM

## 2017-08-09 RX ORDER — NALOXONE HYDROCHLORIDE 0.4 MG/ML
0.4 INJECTION, SOLUTION INTRAMUSCULAR; INTRAVENOUS; SUBCUTANEOUS AS NEEDED
Status: DISCONTINUED | OUTPATIENT
Start: 2017-08-09 | End: 2017-08-11 | Stop reason: HOSPADM

## 2017-08-09 RX ORDER — KETOROLAC TROMETHAMINE 30 MG/ML
30 INJECTION, SOLUTION INTRAMUSCULAR; INTRAVENOUS
Status: DISPENSED | OUTPATIENT
Start: 2017-08-09 | End: 2017-08-10

## 2017-08-09 RX ORDER — NALBUPHINE HYDROCHLORIDE 10 MG/ML
2.5 INJECTION, SOLUTION INTRAMUSCULAR; INTRAVENOUS; SUBCUTANEOUS
Status: DISCONTINUED | OUTPATIENT
Start: 2017-08-09 | End: 2017-08-10

## 2017-08-09 RX ORDER — ONDANSETRON 2 MG/ML
4 INJECTION INTRAMUSCULAR; INTRAVENOUS
Status: DISCONTINUED | OUTPATIENT
Start: 2017-08-09 | End: 2017-08-11 | Stop reason: HOSPADM

## 2017-08-09 RX ORDER — MORPHINE SULFATE 10 MG/ML
6 INJECTION, SOLUTION INTRAMUSCULAR; INTRAVENOUS
Status: DISCONTINUED | OUTPATIENT
Start: 2017-08-09 | End: 2017-08-11 | Stop reason: HOSPADM

## 2017-08-09 RX ADMIN — KETOROLAC TROMETHAMINE 30 MG: 30 INJECTION, SOLUTION INTRAMUSCULAR at 19:58

## 2017-08-09 RX ADMIN — OXYCODONE HYDROCHLORIDE AND ACETAMINOPHEN 1 TABLET: 5; 325 TABLET ORAL at 21:57

## 2017-08-09 RX ADMIN — Medication 10 ML: at 19:58

## 2017-08-09 RX ADMIN — DIPHENHYDRAMINE HYDROCHLORIDE 12.5 MG: 50 INJECTION, SOLUTION INTRAMUSCULAR; INTRAVENOUS at 03:25

## 2017-08-09 RX ADMIN — KETOROLAC TROMETHAMINE 30 MG: 30 INJECTION, SOLUTION INTRAMUSCULAR at 07:19

## 2017-08-09 RX ADMIN — SODIUM CHLORIDE, SODIUM LACTATE, POTASSIUM CHLORIDE, AND CALCIUM CHLORIDE 125 ML/HR: 600; 310; 30; 20 INJECTION, SOLUTION INTRAVENOUS at 01:06

## 2017-08-09 RX ADMIN — Medication 10 ML: at 13:51

## 2017-08-09 RX ADMIN — Medication 10 ML: at 08:42

## 2017-08-09 RX ADMIN — KETOROLAC TROMETHAMINE 30 MG: 30 INJECTION, SOLUTION INTRAMUSCULAR at 13:50

## 2017-08-09 NOTE — ROUTINE PROCESS
Verbal/bedside report received from Cody Cushing, RN using OB SBAR.    1800 Patient declining assessment at this time. Wanting to feed baby and spend time with visitors. Expressed the importance of vitals and assessment, patient states she would call out when she is ready.

## 2017-08-09 NOTE — PROGRESS NOTES
[id number] resumed pushing  Counseled patient about my concern for second stage arrest  This baby is estimated to be 8.5-9lb which is larger than her first, in OP presentation, and patient has had excellent pushing effort  Due to lack of decent of fetal head, I recommended a  section.   She was first resistant and wanted to resume pushing which we did for another 10-15 minutes  She and her family were counseled about risks/ benefits/ alternatives to  section and she decided to proceed with c/s  She was counseled about the risks to surgery and harder recovery and her questions were answered    Will administer Ancef and Azithromycin for antibiotic prophylaxis

## 2017-08-09 NOTE — PROGRESS NOTES
After about an hour of pushing, there is minimal descent of fetal head  OP presentation persists  RN attempted position changes during contractions to encourage OA presentation

## 2017-08-09 NOTE — PROGRESS NOTES
Post-Operative  Day 1    Sagar Majano     Assessment: Active Problems:    High-risk pregnancy in multigravida greater than 28years of age, antepartum (2017)      Single delivery by  (2017)    ---SSA    Post-Op day 1, stable, low grade temp <100, we'll cont. To watch    Plan:   1. Routine post-operative care   2. N/A               3. Mild anemia post op, PNV on dc    Information for the patient's :  Iman Bob [[id number]]   , Low Transverse   Patient doing well without significant complaint. Nausea and vomiting resolved, tolerating liquids, no flatus, kline in place. Current Facility-Administered Medications   Medication Dose Route Frequency    lactated Ringers infusion  125 mL/hr IntraVENous CONTINUOUS    sodium chloride (NS) flush 5-10 mL  5-10 mL IntraVENous Q8H    measles, mumps & rubella Vacc (PF) (M-M-R II) injection 0.5 mL  0.5 mL SubCUTAneous PRIOR TO DISCHARGE    Rho D immune globulin (RHOGAM) 1,500 unit (300 mcg) injection 0.3 mg  300 mcg IntraMUSCular PRIOR TO DISCHARGE    diph,Pertuss(AC),Tet Vac-PF (BOOSTRIX) suspension 0.5 mL  0.5 mL IntraMUSCular PRIOR TO DISCHARGE        Vitals:  Visit Vitals    BP 98/60 (BP 1 Location: Right arm, BP Patient Position: At rest)    Pulse 80    Temp 99.1 °F (37.3 °C)    Resp 18    Ht 5' 3\" (1.6 m)    Wt 70.8 kg (156 lb)    LMP 2016    SpO2 100%    Breastfeeding Unknown    BMI 27.63 kg/m2     Temp (24hrs), Av.7 °F (37.1 °C), Min:97.8 °F (36.6 °C), Max:99.3 °F (37.4 °C)      Last 24hr Input/Output:    Intake/Output Summary (Last 24 hours) at 17 0841  Last data filed at 17 0719   Gross per 24 hour   Intake             1000 ml   Output             2750 ml   Net            -1750 ml          Exam:        Patient without distress. Lungs clear.   Abdomen, bowel sounds present, soft, expected tenderness, fundus firm Wound dressing intact     Perineum normal lochia noted Lower extremities are negative for swelling, cords or tenderness. Labs:     Recent Results (from the past 24 hour(s))   CBC WITH AUTOMATED DIFF    Collection Time: 08/09/17  4:48 AM   Result Value Ref Range    WBC 13.7 (H) 3.6 - 11.0 K/uL    RBC 3.17 (L) 3.80 - 5.20 M/uL    HGB 9.7 (L) 11.5 - 16.0 g/dL    HCT 27.3 (L) 35.0 - 47.0 %    MCV 86.1 80.0 - 99.0 FL    MCH 30.6 26.0 - 34.0 PG    MCHC 35.5 30.0 - 36.5 g/dL    RDW 12.6 11.5 - 14.5 %    PLATELET 623 810 - 715 K/uL    NEUTROPHILS 90 (H) 32 - 75 %    BAND NEUTROPHILS 6 0 - 6 %    LYMPHOCYTES 2 (L) 12 - 49 %    MONOCYTES 2 (L) 5 - 13 %    EOSINOPHILS 0 0 - 7 %    BASOPHILS 0 0 - 1 %    ABS. NEUTROPHILS 13.1 (H) 1.8 - 8.0 K/UL    ABS. LYMPHOCYTES 0.3 (L) 0.8 - 3.5 K/UL    ABS. MONOCYTES 0.3 0.0 - 1.0 K/UL    ABS. EOSINOPHILS 0.0 0.0 - 0.4 K/UL    ABS.  BASOPHILS 0.0 0.0 - 0.1 K/UL    DF MANUAL      RBC COMMENTS OVALOCYTES  PRESENT        RBC COMMENTS POLYCHROMASIA  1+

## 2017-08-09 NOTE — PROGRESS NOTES
2017  11:53 AM    Patient status post Procedure(s):   SECTION on 2017 under epidural with duramorph. Visit Vitals    BP (!) 84/60    Pulse 60    Temp 37 °C (98.6 °F)    Resp 16    Ht 5' 3\" (1.6 m)    Wt 70.8 kg (156 lb)    LMP 2016    SpO2 100%    Breastfeeding Unknown    BMI 27.63 kg/m2     Patient doing relatively well.     moderate itching. Pain is mild . mild nausea and/or vomiting. Urinary: kline draining  PO intake: decreased but adequate  Block site reveals normal exam; no erythema, swelling or tenderness. No complications. Continue current postop pain regimen.         Stevo Mejia MD    [facility]

## 2017-08-09 NOTE — PROGRESS NOTES
Bedside and Verbal shift change report given to VAN Blakely RN (oncoming nurse) by ERICA Ordonez RN (offgoing nurse). Report included the following information SBAR, Kardex, Intake/Output, MAR and Recent Results.

## 2017-08-09 NOTE — OP NOTES
Operative Note    Name: Misti Marion   Medical Record Number: [id number]   YOB: 1975  Today's Date: 2017      Pre-operative Diagnosis: * No pre-op diagnosis entered *    Post-operative Diagnosis: * No post-op diagnosis entered *    Operation: low transverse  section Procedure(s):   SECTION    Surgeon(s):  MD Ezio Ledezma MD    Anesthesia: * No anesthesia type entered *    Prophylactic Antibiotics: Ancef and Azithromycin  DVT Prophylaxis: Sequential Compression Devices and will hose         Fetal Description: barrios     Birth Information:   Information for the patient's :  Rosey Rehman [[id number]]   Delivery of a   Female [1] infant on 2017 at 9:54 PM. Apgars were 8 and 9. Umbilical Cord:     Umbilical Cord Events:     Placenta:  removal with  appearance. Amniotic Fluid Volume: Moderate     Amniotic Fluid Description:  Clear    Placenta:  expressed Expressed    Estimated Blood Loss (ml):  900    Specimens: None           Complications:  none    Procedure Detail:      After proper patient identification and consent, the patient was taken to the operating room, where her epidural was redosed and  found to be adequate. Larson catheter had been placed using sterile technique with return of a small amount of blood tinged urine. The patient was prepped and draped in the normal sterile fashion. The abdomen was entered using the Pfannenstiel technique. The peritoneum was entered sharply well superior to the bladder without any apparent injury. The bladder flap was created. A low transverse uterine incision was made with the scalpel  and extended with blunt finger dissection. Amniotomy was performed and the fluid was small amount clear. The babys head was then delivered atraumatically. The cord was clamped and cut and the baby was handed off to the nursing staff in attendance.  The placenta was then removed from the uterus. The uterus was curettaged with a moist lap pad and cleared of all clots and debris. An extension was noted at the left lateral portion extending down towards the vagina which was repaired using 0 monocryl. One O'Dallas stitch was necessary to achieve hemostasis due to a small tear in the uterine artery, and excellent hemostasis was achieved. The uterine incision was closed with 0 vicryl, double layer,  in a running locking fashion with good hemostasis assured. The uterus was returned to the abdomen and irrigation was performed. Good hemostasis was again reassured. Gel foam was placed overlying the uterine incision as the tissue was edematous and friable, although not actively bleeding. The peritoneum was closed with 2-0 chromic, the rectus muscles were reapproximated with 2-0 chromic in two mattress sutures, then the fascia was closed with 0 Vicryl in a running fashion. Good hemostasis was assured. The subcuticular tissue was closed with 2-0 plain gut and the skin was closed with a 4-0 monocryl subcuticular closure. A sterile aquacel bandage was applied. The patient tolerated the procedure well. Sponge, lap, and needle counts were correct times three and the patient and baby were taken to recovery/postpartum room in stable condition.     Maru Boston MD  August 8, 2017  11:00 PM

## 2017-08-09 NOTE — LACTATION NOTE
This note was copied from a baby's chart. Initial Lactation Consultation - Baby born by  yesterday to a  mom at 43 weeks and 2 days. Mom states she attempted to nurse her first child but she felt she did not produce enough milk and had to supplement with formula the whole time she nursed. Mom states this baby has been latching and nursing well. She is hearing some swallowing. I helped mom with latching this afternoon. I gave her some tips on how to get a deep latch. Feeding Plan: Mother will keep baby skin to skin as often as possible, feed on demand,  respond to feeding cues, obtain latch, listen for audible swallowing, be aware of signs of oxytocin release/ cramping,thrist,sleepyness while breastfeeding, offer both breasts,and will not limit feedings. I recommended that mom begin pumping after nursing to increases breast stimulation and milk production.

## 2017-08-09 NOTE — PROGRESS NOTES
Patient was in hands and knees x 30 min, then labored down with the peanut ball x 30 min  Cat 1 fetal tracing, early decels resolved  Cervix reassess and now C/C/0  Started pushing

## 2017-08-09 NOTE — ANESTHESIA POSTPROCEDURE EVALUATION
Post-Anesthesia Evaluation and Assessment    Patient: Chiquis Reid MRN: [id number]  SSN: [id number]    YOB: 1975  Age: 40 y.o. Sex: female       Cardiovascular Function/Vital Signs  Visit Vitals    /65    Pulse 75    Temp 36.6 °C (97.8 °F)    Resp 18    Ht 5' 3\" (1.6 m)    Wt 70.8 kg (156 lb)    SpO2 100%    Breastfeeding Unknown    BMI 27.63 kg/m2       Patient is status post epidural anesthesia for Procedure(s):   SECTION. Nausea/Vomiting: None    Postoperative hydration reviewed and adequate. Pain:  Pain Scale 1: Numeric (0 - 10) (17)  Pain Intensity 1: 1 (denies pain but has discomfort w/ fundal checks) (17)   Managed    Neurological Status:       Block resolving    Mental Status and Level of Consciousness: Alert and oriented     Pulmonary Status:   O2 Device: Room air (17)   Adequate oxygenation and airway patent    Complications related to anesthesia: None    Post-anesthesia assessment completed.  No concerns    Signed By: Milton Cox DO     2017

## 2017-08-09 NOTE — PROGRESS NOTES
TRANSFER - IN REPORT:    Verbal report received from maurice march rn(name) on Chiquis Reid  being received from labor and delivery(unit) for routine progression of care      Report consisted of patients Situation, Background, Assessment and   Recommendations(SBAR). Information from the following report(s) SBAR, OR Summary, Procedure Summary, Intake/Output, MAR and Recent Results was reviewed with the receiving nurse. Opportunity for questions and clarification was provided. Assessment completed upon patients arrival to unit and care assumed.

## 2017-08-09 NOTE — L&D DELIVERY NOTE
Delivery Summary  Patient: Savannah Purvis             Circumcision:   NA-female  Additional Delivery Comments - Primary low transverse  section performed for second stage arrest and persistent OP presentation. Information for the patient's :  Isabella Diggs [[id number]]       Labor Events:    Labor: No   Rupture Date: 2017   Rupture Time: 10:29 AM   Rupture Type AROM   Amniotic Fluid Volume: Moderate    Amniotic Fluid Description: Clear None   Induction: Oxytocin       Augmentation: AROM   Labor Events: Failure to Progress in Second Stage     Cervical Ripening:     None     Delivery Events:  Episiotomy: None   Laceration(s): None     Repaired: None    Number of Repair Packets:     Suture Type and Size: None     Estimated Blood Loss (ml):  ml       Delivery Date: 2017    Delivery Time: 9:54 PM  Delivery Type: , Low Transverse  Sex:  Female     Gestational Age: 44w2d   Delivery Clinician:  James Garcia  Living Status: Living   Delivery Location: L&D OR 2          APGARS  One minute Five minutes Ten minutes   Skin color: 0   1        Heart rate: 2   2        Grimace: 2   2        Muscle tone: 2   2        Breathin   2        Totals: 8   9            Presentation: Vertex    Position: Right Occiput Posterior  Resuscitation Method:  Suctioning-bulb; Tactile Stimulation     Meconium Stained: None      Cord Vessels: 3 Vessels      Cord Events:    Cord Blood Sent?:  No    Blood Gases Sent?:  No    Placenta:  Date/Time:   9:55 PM  Removal: Manual Removal      Appearance: Normal     [place] Measurements:  Birth Weight:        Birth Length:        Head Circumference:        Chest Circumference:       Abdominal Girth:       Other Providers:   JON Hemphill;VIELKA BRANDT, Obstetrician;Primary Nurse;Primary  Nurse       Cord pH:  none    Episiotomy: None   Laceration(s): None     Estimated Blood Loss (ml): 900    Labor Events  Method: Oxytocin      Augmentation: AROM   Cervical Ripening:       None     Operative Vaginal Delivery - none    Group B Strep:   Lab Results   Component Value Date/Time    Kimberlytretrevin, External negative  2017     Information for the patient's :  Ophelia Singh [[id number]]   No results found for: ABORH, PCTABR, PCTDIG, BILI, ABORHEXT, ABORH    No results found for: APH, APCO2, APO2, AHCO3, ABEC, ABDC, O2ST, EPHV, PCO2V, PO2V, HCO3V, EBEV, EBDV, SITE, RSCOM

## 2017-08-10 PROCEDURE — 74011250637 HC RX REV CODE- 250/637: Performed by: OBSTETRICS & GYNECOLOGY

## 2017-08-10 PROCEDURE — 65410000002 HC RM PRIVATE OB

## 2017-08-10 RX ORDER — NALBUPHINE HYDROCHLORIDE 10 MG/ML
5 INJECTION, SOLUTION INTRAMUSCULAR; INTRAVENOUS; SUBCUTANEOUS
Status: DISCONTINUED | OUTPATIENT
Start: 2017-08-10 | End: 2017-08-11 | Stop reason: HOSPADM

## 2017-08-10 RX ORDER — NALBUPHINE HYDROCHLORIDE 10 MG/ML
2.5 INJECTION, SOLUTION INTRAMUSCULAR; INTRAVENOUS; SUBCUTANEOUS
Status: DISCONTINUED | OUTPATIENT
Start: 2017-08-10 | End: 2017-08-11 | Stop reason: HOSPADM

## 2017-08-10 RX ADMIN — ALUMINUM HYDROXIDE AND MAGNESIUM HYDROXIDE 30 ML: 200; 200 SUSPENSION ORAL at 08:38

## 2017-08-10 RX ADMIN — ALUMINUM HYDROXIDE AND MAGNESIUM HYDROXIDE 30 ML: 200; 200 SUSPENSION ORAL at 14:22

## 2017-08-10 RX ADMIN — IBUPROFEN 600 MG: 600 TABLET, FILM COATED ORAL at 11:07

## 2017-08-10 RX ADMIN — DOCUSATE SODIUM 100 MG: 100 CAPSULE, LIQUID FILLED ORAL at 17:19

## 2017-08-10 RX ADMIN — IBUPROFEN 600 MG: 600 TABLET, FILM COATED ORAL at 17:19

## 2017-08-10 RX ADMIN — OXYCODONE HYDROCHLORIDE AND ACETAMINOPHEN 2 TABLET: 5; 325 TABLET ORAL at 03:53

## 2017-08-10 NOTE — PROGRESS NOTES
Post-Operative  Day 2    Sagar Majano     Assessment: Post-Op day 2, doing well, BF well. Plan:   1. Routine post-operative care, DC tomorrow    Information for the patient's :  Alexandra Wright [[id number]]   , Low Transverse   Patient doing well without significant complaint. Nausea and vomiting resolved, tolerating liquids, passing flatus, voiding and ambulating without difficulty. Vitals:  Visit Vitals    /60 (BP 1 Location: Left arm, BP Patient Position: At rest)    Pulse 80    Temp 97.9 °F (36.6 °C)    Resp 16    Ht 5' 3\" (1.6 m)    Wt 70.8 kg (156 lb)    LMP 2016    SpO2 100%    Breastfeeding Unknown    BMI 27.63 kg/m2     Temp (24hrs), Av.4 °F (36.9 °C), Min:97.9 °F (36.6 °C), Max:98.6 °F (37 °C)        Exam:        Patient without distress. Abdomen, bowel sounds present, soft, expected tenderness, fundus firm                Wound incision clean, dry and intact               Lower extremities are negative for swelling, cords or tenderness. Labs:   Lab Results   Component Value Date/Time    WBC 13.7 2017 04:48 AM    WBC 4.2 2017 07:00 AM    WBC 6.4 2016 11:38 AM    WBC 5.8 2016 08:23 AM    HGB 9.7 2017 04:48 AM    HGB 11.4 2017 07:00 AM    HGB 13.2 2016 11:38 AM    HGB 13.5 2016 08:23 AM    HCT 27.3 2017 04:48 AM    HCT 31.7 2017 07:00 AM    HCT 39.8 2016 11:38 AM    HCT 40.3 2016 08:23 AM    PLATELET 793  04:48 AM    PLATELET 319  07:00 AM    PLATELET 600  11:38 AM    PLATELET 396  08:23 AM       No results found for this or any previous visit (from the past 24 hour(s)).

## 2017-08-10 NOTE — LACTATION NOTE
This note was copied from a baby's chart. Mom states the baby has been latching and nursing well. She has been hearing swallowing and the baby seems content between feedings. Mom states she feels her breasts are feeling wylie. Mom states she did pump a couple times yesterday after nursing. Baby's weight loss is 9.9%. I recommended that mom try to feed the baby more frequently to try to increase the stimulation to her breasts. Mom was concerned that if she nursed more often then her breasts would not have time to fill with milk. I reviewed with her how milk is produced and the how nursing more frequently would allow the body to produce more milk. Mom was resistant to waking the baby at least every 3 hours. She wanted to allow the baby to sleep as long as she wanted. I encouraged mom to continue to pump after nursing and offer the baby any breast milk she pumps. Mom decided to give the baby some formula this morning.

## 2017-08-11 VITALS
HEIGHT: 63 IN | WEIGHT: 156 LBS | TEMPERATURE: 98.8 F | RESPIRATION RATE: 16 BRPM | OXYGEN SATURATION: 100 % | BODY MASS INDEX: 27.64 KG/M2 | DIASTOLIC BLOOD PRESSURE: 73 MMHG | SYSTOLIC BLOOD PRESSURE: 118 MMHG | HEART RATE: 89 BPM

## 2017-08-11 PROCEDURE — 74011250637 HC RX REV CODE- 250/637: Performed by: OBSTETRICS & GYNECOLOGY

## 2017-08-11 RX ORDER — OXYCODONE AND ACETAMINOPHEN 5; 325 MG/1; MG/1
2 TABLET ORAL
Qty: 20 TAB | Refills: 0 | Status: SHIPPED | OUTPATIENT
Start: 2017-08-11 | End: 2018-04-25

## 2017-08-11 RX ORDER — IBUPROFEN 600 MG/1
600 TABLET ORAL
Qty: 40 TAB | Refills: 0 | Status: SHIPPED | OUTPATIENT
Start: 2017-08-11 | End: 2018-04-25

## 2017-08-11 RX ORDER — DOCUSATE SODIUM 100 MG/1
100 CAPSULE, LIQUID FILLED ORAL 2 TIMES DAILY
Qty: 60 CAP | Refills: 0 | Status: SHIPPED | OUTPATIENT
Start: 2017-08-11 | End: 2018-04-25

## 2017-08-11 RX ADMIN — OXYCODONE HYDROCHLORIDE AND ACETAMINOPHEN 2 TABLET: 5; 325 TABLET ORAL at 00:04

## 2017-08-11 RX ADMIN — IBUPROFEN 600 MG: 600 TABLET, FILM COATED ORAL at 07:50

## 2017-08-11 RX ADMIN — ALUMINUM HYDROXIDE AND MAGNESIUM HYDROXIDE 30 ML: 200; 200 SUSPENSION ORAL at 03:16

## 2017-08-11 NOTE — DISCHARGE INSTRUCTIONS
POSTPARTUM DISCHARGE INSTRUCTIONS       Name:  Chiquis Reid  YOB: 1975  Admission Diagnosis:  High-risk pregnancy in multigravida greater than 28years of age, antepartum     Discharge Diagnosis:    Problem List as of 2017  Date Reviewed: 2017          Codes Class Noted - Resolved    Single delivery by  ICD-10-CM: O82  ICD-9-CM: 669.71  2017 - Present        High-risk pregnancy in multigravida greater than 28years of age, antepartum ICD-10-CM: O09.529  ICD-9-CM: V23.82  2017 - Present        Infertility, female ICD-10-CM: N97.9  ICD-9-CM: 628.9  Unknown - Present        Anxiety ICD-10-CM: F41.9  ICD-9-CM: 300.00  Unknown - Present            Attending Physician:  Jacobo Armstrong MD    Delivery Type:   Section: What to Expect at Home    Your Recovery:  A  section, or , is surgery to deliver your baby through a cut, called an incision that the doctor makes in your lower belly and uterus. You may have some pain in your lower belly and need pain medicine for 1 to 2 weeks. You can expect some vaginal bleeding for several weeks. You will probably need about 6 weeks to fully recover. It is important to take it easy while the incision is healing. Avoid heavy lifting, strenuous activities, or exercises that strain the belly muscles while you are recovering. Ask a family member or friend for help with housework, cooking, and shopping. This care sheet gives you a general idea about how long it will take for you to recover. But each person recovers at a different pace. Follow the steps below to get better as quickly as possible. How can you care for yourself at home? Activity  · Rest when you feel tired. Getting enough sleep will help you recover. · Try to walk each day. Start by walking a little more than you did the day before. Bit by bit, increase the amount you walk.  Walking boosts blood flow and helps prevent pneumonia, constipation, and blood clots. · Avoid strenuous activities, such as bicycle riding, jogging, weightlifting, and aerobic exercise, for 6 weeks or until your doctor says it is okay. · Until your doctor says it is okay, do not lift anything heavier than your baby. · Do not do sit-ups or other exercise that strain the belly muscles for 6 weeks or until your doctor says it is okay. · Hold a pillow over your incision when you cough or take deep breaths. This will support your belly and decrease your pain. · You may shower as usual. Pat the incision dry when you are done. · You will have some vaginal bleeding. Wear sanitary pads. Do not douche or use tampons until your doctor says it is okay. · Ask your doctor when you can drive again. · You will probably need to take at least 6 weeks off work. It depends on the type of work you do and how you feel. · Wait until you are healed (about 4 to 6 weeks) before you have sexual intercourse. Your doctor will tell you when it is okay to have sex. · Talk to your doctor about birth control. You can get pregnant even before your period returns. Also, you can get pregnant while you are breast-feeding. Incision care  Your skin is your body's first line of defense against germs, but an incision site leaves an easy way for germs to enter your body. To prevent infection:  · Clean your hands frequently and before and after changing any touching any dressings. · If you have strips of tape on the incision, leave the tape on for a week or until it falls off. · Look at your incision closely every day for any changes. Contact your doctor if you experience any signs of infection, such as fever or increased redness at the surgical site. · Wash the area daily with warm, soapy water, and pat it dry. Don't use hydrogen peroxide or alcohol, which can slow healing. You may cover the area with a gauze bandage if it weeps or rubs against clothing. Change the bandage every day.   · Keep the area clean and dry.    Diet  · You can eat your normal diet. If your stomach is upset, try bland, low-fat foods like plain rice, broiled chicken, toast, and yogurt. · Drink plenty of fluids (unless your doctor tells you not to). · You may notice that your bowel movements are not regular right after your surgery. This is common. Try to avoid constipation and straining with bowel movements. You may want to take a fiber supplement every day. If you have not had a bowel movement after a couple of days, ask your doctor about taking a mild laxative. · If you are breast-feeding, do not drink any alcohol. Medicines  · Take pain medicines exactly as directed. · If the doctor gave you a prescription medicine for pain, take it as prescribed. · If you are not taking a prescription pain medicine, ask your doctor if you can take an over-the-counter medicine such as acetaminophen (Tylenol), ibuprofen (Advil, Motrin), or naproxen (Aleve), for cramps. Read and follow all instructions on the label. Do not take aspirin, because it can cause more bleeding. Do not take acetaminophen (Tylenol) and other acetaminophen containing medications (i.e. Percocet) at the same time. · If you think your pain medicine is making you sick to your stomach:  · Take your medicine after meals (unless your doctor has told you not to). · Ask your doctor for a different pain medicine. · If your doctor prescribed antibiotics, take them as directed. Do not stop taking them just because you feel better. You need to take the full course of antibiotics. Mental Health  · Many women get the \"baby blues\" during the first few days after childbirth. You may lose sleep, feel irritable, and cry easily. You may feel happy one minute and sad the next. Hormone changes are one cause of these emotional changes. Also, the demands of a new baby, along with visits from relatives or other family needs, add to a mother's stress. The \"baby blues\" often peak around the fourth day. Then they ease up in less than 2 weeks. · If your moodiness or anxiety lasts for more than 2 weeks, or if you feel like life is not worth living, you may have postpartum depression. This is different for each mother. Some mothers with serious depression may worry intensely about their infant's well-being. Others may feel distant from their child. Some mothers might even feel that they might harm their baby. A mother may have signs of paranoia, wondering if someone is watching her. · With all the changes in your life, you may not know if you are depressed. Pregnancy sometimes causes changes in how you feel that are similar to the symptoms of depression. · Symptoms of depression include:  · Feeling sad or hopeless and losing interest in daily activities. These are the most common symptoms of depression. · Sleeping too much or not enough. · Feeling tired. You may feel as if you have no energy. · Eating too much or too little. · POST[employer] (PSI) offers a Warm line; Chat with the Expert phone sessions; Information and Articles about Pregnancy and Postpartum Mood Disorders; Comprehensive List of Free Support Groups; Knowledgeable local coordinators who will offer support, information, and resources; Guide to Resources on [employer]; Calendar of events in the  mood disorders community; Latest News and Research; and [facility] & [facility] [address] for [employer]. Remember - You are not alone; You are not to blame; With help, you will be well. 7-506-029-PPD(9182). WWW. POSTPARTUM. NET   · Writing or talking about death, such as writing suicide notes or talking about guns, knives, or pills. Keep the numbers for these national suicide hotlines: 6-680-674-TALK (5-550.665.1771) and 6-033-FJTRYJU (0-759.867.9732). If you or someone you know talks about suicide or feeling hopeless, get help right away.     Other instructions  · If you breast-feed your baby, you may be more comfortable while you are healing if you place the baby so that he or she is not resting on your belly. Try tucking your baby under your arm, with his or her body along the side you will be feeding on. Support your baby's upper body with your arm. With that hand you can control your baby's head to bring his or her mouth to your breast. This is sometimes called the football hold. Follow-up care is a key part of your treatment and safety. Be sure to make and go to all appointments, and call your doctor if you are having problems. It's also a good idea to know your test results and keep a list of the medicines you take. When should you call for help? Call 911 anytime you think you may need emergency care. For example, call if:  · You are thinking of hurting yourself, your baby, or anyone else. · You passed out (lost consciousness). · You have symptoms of a blood clot in your lung (called a pulmonary embolism). These may include:  · Sudden chest pain. · Trouble breathing. · Coughing up blood. Call your doctor now or seek immediate medical care if:    · You have severe vaginal bleeding. · You are soaking through a pad each hour for 2 or more hours. · Your vaginal bleeding seems to be getting heavier or is still bright red 4 days after delivery. · You are dizzy or lightheaded, or you feel like you may faint. · You are vomiting or cannot keep fluids down. · You have a fever. · You have new or more belly pain. · You have loose stitches, or your incision comes open. · You have symptoms of infection, such as:  · Increased pain, swelling, warmth, or redness. · Red streaks leading from the incision. · Pus draining from the incision. · A fever  · You pass tissue (not just blood). · Your vaginal discharge smells bad. · Your belly feels tender or full and hard. · Your breasts are continuously painful or red. · You feel sad, anxious, or hopeless for more than a few days.   · You have sudden, severe pain in your belly.  · You have symptoms of a blood clot in your leg (called a deep vein thrombosis), such as:  · Pain in your calf, back of the knee, thigh, or groin. · Redness and swelling in your leg or groin. · You have symptoms of preeclampsia, such as:  · Sudden swelling of your face, hands, or feet. · New vision problems (such as dimness or blurring). · A severe headache. · Your blood pressure is higher than it should be or rises suddenly. · You have new nausea or vomiting. Watch closely for changes in your health, and be sure to contact your doctor if you have any problems. Additional Information:  Not applicable    These are general instructions for a healthy lifestyle:    No smoking/ No tobacco products/ Avoid exposure to second hand smoke    Surgeon General's Warning:  Quitting smoking now greatly reduces serious risk to your health. Obesity, smoking, and sedentary lifestyle greatly increases your risk for illness    A healthy diet, regular physical exercise & weight monitoring are important for maintaining a healthy lifestyle    Recognize signs and symptoms of STROKE:    F-face looks uneven    A-arms unable to move or move unevenly    S-speech slurred or non-existent    T-time-call 911 as soon as signs and symptoms begin - DO NOT go       back to bed or wait to see if you get better - TIME IS BRAIN. I have had the opportunity to make my options or choices for discharge. I have received and understand these instructions.

## 2017-08-11 NOTE — LACTATION NOTE
This note was copied from a baby's chart.     Couplet Interdisciplinary Rounds     MATERNAL    Daily Goal:     Influenza screening completed: NA   Tdap screening completed: YES   Rhogam Given:NO  MMR Given:NO    VTE Prophylaxis: Not indicated, per Provider order    EPDS:            Patient Name: Otilio Sagastume Diagnosis:   Single liveborn, born in hospital, delivered by  section   Date of Admission: 2017 LOS: 3  Gestational Age: Gestational Age: 44w2d       Daily Goal:     Birth Weight: 3.68 kg Current Weight: Weight: 3.38 kg (7 lb 7 oz)  % of Weight Change: -8%    Feeding:  [place] Metabolic Screen: YES    Hepatitis B:  YES    Discharge Bili:  YES  Car Seat Trial, if needed:  N/A      Patient/Family Teaching Needs:     Days before discharge: Ready for discharge    In Attendance:  Nursing and Physician

## 2017-08-11 NOTE — LACTATION NOTE
This note was copied from a baby's chart. Mom and baby scheduled for discharge today. Mom states the baby has been latching and nursing well. She feels her breasts are getting wylie and her milk is coming in. Mom was giving formula yesterday and through out the night because baby's weight had dropped. Baby's weight increased over night and mom pumped once during the night and was able to collect 1 ounce of milk.

## 2017-08-11 NOTE — ROUTINE PROCESS
Bedside shift change report given to LANIE Sung RN and ERICA Griffith RN (oncoming nurse) by Suzanna Ambrose RN (offgoing nurse). Report included the following information SBAR, Kardex, MAR, Accordion and Recent Results.

## 2017-08-11 NOTE — PROGRESS NOTES
Dr. Reich Hand in to redress patients incisional dressing. Area noted to be clean dry and well approximated. No active bleeding noted. Area redressed with aquacel silver and transparent tape.

## 2017-08-11 NOTE — PROGRESS NOTES
Post-Operative  Day 3    Aljoelmalachi Ghazala Melecio       Assessment: Active Problems:    High-risk pregnancy in multigravida greater than 28years of age, antepartum (2017)      Single delivery by  (2017)      Post-Op day 3, doing well    Plan:   1. Discharge home today  2. Follow up in office in 6 weeks with Zita Jaquez MD  3. Post partum activity/wound care advised, diet as tolerated  4. Discharge Medications: ibuprofen, percocet and medications prior to admission      Information for the patient's :  Alexandra Wright [[id number]]   , Low Transverse   Patient doing well without significant complaint. Tolerating diet, passing flatus, voiding and ambulating without difficulty    Current Facility-Administered Medications   Medication Dose Route Frequency    oxytocin (PITOCIN) 20 units/1000 ml LR  125-1,000 mL/hr IntraVENous TITRATE    lactated Ringers infusion  125 mL/hr IntraVENous CONTINUOUS    sodium chloride (NS) flush 5-10 mL  5-10 mL IntraVENous Q8H    measles, mumps & rubella Vacc (PF) (M-M-R II) injection 0.5 mL  0.5 mL SubCUTAneous PRIOR TO DISCHARGE    Rho D immune globulin (RHOGAM) 1,500 unit (300 mcg) injection 0.3 mg  300 mcg IntraMUSCular PRIOR TO DISCHARGE    diph,Pertuss(AC),Tet Vac-PF (BOOSTRIX) suspension 0.5 mL  0.5 mL IntraMUSCular PRIOR TO DISCHARGE       Vitals:  Visit Vitals    /72 (BP 1 Location: Left arm, BP Patient Position: At rest;Sitting)    Pulse 64    Temp 98.2 °F (36.8 °C)    Resp 16    Ht 5' 3\" (1.6 m)    Wt 70.8 kg (156 lb)    LMP 2016    SpO2 100%    Breastfeeding Unknown    BMI 27.63 kg/m2     Temp (24hrs), Av.9 °F (36.6 °C), Min:97.6 °F (36.4 °C), Max:98.2 °F (36.8 °C)        Exam:        Patient without distress.                Abdomen, bowel sounds present, soft, expected tenderness, fundus firm                Wound incision clean, dry and intact               Lower extremities are negative for swelling, cords or tenderness. Labs:   Lab Results   Component Value Date/Time    WBC 13.7 08/09/2017 04:48 AM    WBC 4.2 08/08/2017 07:00 AM    WBC 6.4 11/16/2016 11:38 AM    HGB 9.7 08/09/2017 04:48 AM    HGB 11.4 08/08/2017 07:00 AM    HGB 13.2 11/16/2016 11:38 AM    HCT 27.3 08/09/2017 04:48 AM    HCT 31.7 08/08/2017 07:00 AM    HCT 39.8 11/16/2016 11:38 AM    PLATELET 487 07/83/1200 04:48 AM    PLATELET 170 37/67/0457 07:00 AM    PLATELET 109 45/54/4321 11:38 AM       No results found for this or any previous visit (from the past 24 hour(s)).

## 2017-08-11 NOTE — DISCHARGE SUMMARY
Obstetrical Discharge Summary     Name: Tere Banda MRN: [id number]  SSN: [id number]    YOB: 1975  Age: 40 y.o. Sex: female      Admit Date: 2017    Discharge Date: 2017    Discharge Diagnoses:   High-risk pregnancy in multigravida greater than 28years of age, antepartum     Procedure Preformed: Procedure(s):  [address]    Admitting Physician: Anali Peña MD     Attending Physician:  Anali Peña MD     Condition on Discharge:   good    Disposition:  Home    Additional Diagnoses: Active Problems:    High-risk pregnancy in multigravida greater than 28years of age, antepartum (2017)      Single delivery by  (2017)        Lab Results   Component Value Date/Time    Rubella, External immune  2017    GrBStrep, External negative  2017     Recent Labs      17   0448   HGB  9.7[facility] Course: Normal hospital course following the delivery. Patient Instructions:   Current Discharge Medication List      START taking these medications    Details   oxyCODONE-acetaminophen (PERCOCET) 5-325 mg per tablet Take 2 Tabs by mouth every four (4) hours as needed for Pain. Max Daily Amount: 12 Tabs. Qty: 20 Tab, Refills: 0      docusate sodium (COLACE) 100 mg capsule Take 1 Cap by mouth two (2) times a day. Qty: 60 Cap, Refills: 0      ibuprofen (MOTRIN) 600 mg tablet Take 1 Tab by mouth every six (6) hours as needed for Pain. Qty: 40 Tab, Refills: 0         CONTINUE these medications which have NOT CHANGED    Details   ferrous sulfate (SLOW FE) 142 mg (45 mg iron) ER tablet Take  by mouth Daily (before breakfast). PNV95/FERROUS FUMARATE/FA (PRENATAL PO) Take  by mouth daily. cholecalciferol, vitamin D3, (VITAMIN D3) 2,000 unit tab Take 2,000 Units by mouth daily.          STOP taking these medications       raNITIdine (ZANTAC 75) 75 mg tablet Comments:   Reason for Stopping:         sodium chloride 1 gram tablet Comments:   Reason for Stopping:         fludrocortisone (FLORINEF) 0.1 mg tablet Comments:   Reason for Stopping:         omeprazole (PRILOSEC) 10 mg capsule Comments:   Reason for Stopping:         valACYclovir (VALTREX) 1 gram tablet Comments:   Reason for Stopping:         ALPRAZolam (XANAX) 0.25 mg tablet Comments:   Reason for Stopping:               Reference my discharge instructions. FOLLOW-UP Care:  Ambulate in house, No driving for 2 weeks, No sex for 6 weeks, No driving while on analgesics and No heavy lifting for 6 weeks  Regular Diet  Wound Care: pericare as directed  Follow-up Information     Follow up With Details Comments MD Poncho   [address] Internal Medicine  [phone]  [phone]          Current Discharge Medication List      START taking these medications    Details   oxyCODONE-acetaminophen (PERCOCET) 5-325 mg per tablet Take 2 Tabs by mouth every four (4) hours as needed for Pain. Max Daily Amount: 12 Tabs. Qty: 20 Tab, Refills: 0      docusate sodium (COLACE) 100 mg capsule Take 1 Cap by mouth two (2) times a day. Qty: 60 Cap, Refills: 0      ibuprofen (MOTRIN) 600 mg tablet Take 1 Tab by mouth every six (6) hours as needed for Pain. Qty: 40 Tab, Refills: 0         CONTINUE these medications which have NOT CHANGED    Details   PNV95/FERROUS FUMARATE/FA (PRENATAL PO) Take  by mouth daily. cholecalciferol, vitamin D3, (VITAMIN D3) 2,000 unit tab Take 2,000 Units by mouth daily.          STOP taking these medications       raNITIdine (ZANTAC 75) 75 mg tablet Comments:   Reason for Stopping:         sodium chloride 1 gram tablet Comments:   Reason for Stopping:         fludrocortisone (FLORINEF) 0.1 mg tablet Comments:   Reason for Stopping:         omeprazole (PRILOSEC) 10 mg capsule Comments:   Reason for Stopping:         valACYclovir (VALTREX) 1 gram tablet Comments:   Reason for Stopping:         ALPRAZolam (XANAX) 0.25 mg tablet Comments:   Reason for Stopping:                 Signed By:  Herberth Gallagher MD     August 11, 2017                       BST

## 2018-04-25 ENCOUNTER — OFFICE VISIT (OUTPATIENT)
Dept: INTERNAL MEDICINE CLINIC | Age: 43
End: 2018-04-25

## 2018-04-25 VITALS
BODY MASS INDEX: 25.34 KG/M2 | HEIGHT: 63 IN | WEIGHT: 143 LBS | TEMPERATURE: 98.3 F | OXYGEN SATURATION: 96 % | HEART RATE: 80 BPM | SYSTOLIC BLOOD PRESSURE: 96 MMHG | DIASTOLIC BLOOD PRESSURE: 64 MMHG

## 2018-04-25 DIAGNOSIS — F41.9 ANXIETY: ICD-10-CM

## 2018-04-25 DIAGNOSIS — Z00.00 ROUTINE PHYSICAL EXAMINATION: Primary | ICD-10-CM

## 2018-04-25 DIAGNOSIS — G90.A POTS (POSTURAL ORTHOSTATIC TACHYCARDIA SYNDROME): ICD-10-CM

## 2018-04-25 DIAGNOSIS — E66.3 OVERWEIGHT (BMI 25.0-29.9): ICD-10-CM

## 2018-04-25 DIAGNOSIS — Z13.31 DEPRESSION SCREENING NEGATIVE: ICD-10-CM

## 2018-04-25 PROBLEM — O09.529 HIGH-RISK PREGNANCY IN MULTIGRAVIDA GREATER THAN 35 YEARS OF AGE, ANTEPARTUM: Status: RESOLVED | Noted: 2017-08-08 | Resolved: 2018-04-25

## 2018-04-25 NOTE — PROGRESS NOTES
Donaldo Castrejon is a 44 y.o. female who was seen in clinic today (4/25/2018) for a full physical.        Assessment & Plan:   Diagnoses and all orders for this visit:    1. Routine physical examination  -     Previous labs reviewed & discussed with her. Reviewed repeating since last labs done during pregnancy/delivery. She declined & will defer to next visit. 2. Depression screening negative    3. Overweight (BMI 25.0-29. 9)- new dx, is improving, I have reviewed/discussed the above normal BMI with the patient. I have recommended the following interventions: encourage exercise and lifestyle education regarding diet. 4. POTS (postural orthostatic tachycardia syndrome)- stable and asymptomatic, continue current treatment (licorice) and will get Dr Teetee Purcell records, defer to specialist     5. Anxiety- stable, reviewed treatment options with her, reviewed life style changes to help improve mood, continue current treatment. Follow-up Disposition:  Return in about 2 years (around 4/25/2020) for FULL PHYSICAL - 30 minutes. ------------------------------------------------------------------------------------------    Subjective:   Ivelisse Nguyen is here today for a full physical.      Health Maintenance  Immunizations:    Influenza: up to date. Tetanus: up to date. Cancer screening:    Cervical: reviewed guidelines, UTD, done by OBGYN, records requested. Breast: she will schedule for 4-5 months from now, waiting due to just finishing breast feeding, reviewed guidelines      Patient Care Team:  Aby Castillo MD as PCP - General (Internal Medicine)  Cheri Garcia MD (Endocrinology)  Stacia Monte MD (Obstetrics & Gynecology)  Johann Barber MD (Dermatology)  Raquel Day MD (Cardiology)  Alexandra Christopher MD as Physician (Nephrology)         The following sections were reviewed & updated as appropriate: PMH, PSH, FH, and SH.     Patient Active Problem List   Diagnosis Code    Infertility, female N97.9    Anxiety F41.9          Prior to Admission medications    Medication Sig Start Date End Date Taking? Authorizing Provider   vit A/vit C/vit E/zinc/copper (PRESERVISION AREDS PO) Take  by mouth. Yes Historical Provider   fluticasone propionate (FLONASE NA) by Nasal route. Yes Historical Provider   OTHER Licorice daily   Yes Historical Provider          No Known Allergies           Review of Systems   Constitutional: Negative for chills and fever. Respiratory: Negative for cough and shortness of breath. Cardiovascular: Negative for chest pain and palpitations. Gastrointestinal: Negative for abdominal pain, blood in stool, constipation, diarrhea, heartburn, nausea and vomiting. Musculoskeletal: Positive for back pain (new since pregnancy, improving w/ yoga & stretching). Negative for joint pain and myalgias. Neurological: Negative for dizziness, tingling, focal weakness and headaches. Endo/Heme/Allergies: Does not bruise/bleed easily. Psychiatric/Behavioral: Negative for depression. The patient is not nervous/anxious and does not have insomnia. Objective:   Physical Exam   Constitutional: She appears well-developed. No distress. HENT:   Right Ear: Tympanic membrane, external ear and ear canal normal.   Left Ear: Tympanic membrane, external ear and ear canal normal.   Nose: Nose normal.   Mouth/Throat: Uvula is midline, oropharynx is clear and moist and mucous membranes are normal. No posterior oropharyngeal erythema. Eyes: Conjunctivae and lids are normal. No scleral icterus. Neck: Neck supple. Carotid bruit is not present. No thyromegaly present. Cardiovascular: Regular rhythm and normal heart sounds. No murmur heard. Pulses:       Dorsalis pedis pulses are 2+ on the right side, and 2+ on the left side. Posterior tibial pulses are 2+ on the right side, and 2+ on the left side. Pulmonary/Chest: Effort normal and breath sounds normal. She has no wheezes. She has no rales. Abdominal: Soft. Bowel sounds are normal. She exhibits no mass. There is no hepatosplenomegaly. There is no tenderness. Musculoskeletal: Normal range of motion. She exhibits no edema. Cervical back: Normal.        Thoracic back: She exhibits no bony tenderness. Lumbar back: Normal.   Lymphadenopathy:     She has no cervical adenopathy. Neurological: She has normal strength. No sensory deficit. Skin: No rash noted. Psychiatric: She has a normal mood and affect. Her behavior is normal.          Visit Vitals    BP 96/64    Pulse 80    Temp 98.3 °F (36.8 °C) (Oral)    Ht 5' 2.5\" (1.588 m)    Wt 143 lb (64.9 kg)    LMP 04/02/2018 (Approximate)    SpO2 96%    BMI 25.74 kg/m2          Advised her to call back or return to office if symptoms worsen/change/persist.  Discussed expected course/resolution/complications of diagnosis in detail with patient. Medication risks/benefits/costs/interactions/alternatives discussed with patient. She was given an after visit summary which includes diagnoses, current medications, & vitals. She expressed understanding with the diagnosis and plan. Aspects of this note may have been generated using voice recognition software. Despite editing, there may be some syntax errors.        Andre Strauss MD

## 2018-04-25 NOTE — PATIENT INSTRUCTIONS

## 2021-05-14 ENCOUNTER — OFFICE VISIT (OUTPATIENT)
Dept: INTERNAL MEDICINE CLINIC | Age: 46
End: 2021-05-14
Payer: COMMERCIAL

## 2021-05-14 VITALS
SYSTOLIC BLOOD PRESSURE: 87 MMHG | TEMPERATURE: 97.8 F | HEART RATE: 77 BPM | HEIGHT: 62 IN | RESPIRATION RATE: 14 BRPM | OXYGEN SATURATION: 98 % | BODY MASS INDEX: 24.66 KG/M2 | WEIGHT: 134 LBS | DIASTOLIC BLOOD PRESSURE: 60 MMHG

## 2021-05-14 DIAGNOSIS — G90.A POTS (POSTURAL ORTHOSTATIC TACHYCARDIA SYNDROME): ICD-10-CM

## 2021-05-14 DIAGNOSIS — F41.9 ANXIETY: ICD-10-CM

## 2021-05-14 DIAGNOSIS — Z00.00 ROUTINE PHYSICAL EXAMINATION: Primary | ICD-10-CM

## 2021-05-14 PROCEDURE — 99386 PREV VISIT NEW AGE 40-64: CPT | Performed by: INTERNAL MEDICINE

## 2021-05-14 RX ORDER — ALPRAZOLAM 0.5 MG/1
0.25 TABLET ORAL AS NEEDED
COMMUNITY
End: 2021-05-14 | Stop reason: SDUPTHER

## 2021-05-14 RX ORDER — OMEPRAZOLE 20 MG/1
20 CAPSULE, DELAYED RELEASE ORAL DAILY
COMMUNITY
Start: 2021-05-07 | End: 2021-05-21

## 2021-05-14 RX ORDER — ALPRAZOLAM 0.5 MG/1
.25-.5 TABLET ORAL AS NEEDED
Qty: 5 TAB | Refills: 0 | Status: SHIPPED | OUTPATIENT
Start: 2021-05-14 | End: 2021-11-12 | Stop reason: SDUPTHER

## 2021-05-14 NOTE — ASSESSMENT & PLAN NOTE
Intermittent, reports using meds at night, 1/2 tab maybe once every 2 wks to every 2-3 months. Mostly around stress w/ work & kids. Will provide 5 tabs to use prn and will monitor usage. Reviewed alternative life style stress relief techniques and she is aware of these.    reviewed, nothing in last 2 yrs

## 2021-05-14 NOTE — PROGRESS NOTES
Jennyfer Pantoja is a 40 y.o. female who was seen in clinic today (5/14/2021) for a full physical.  She is here to re-establish care, last seen in April '18      Assessment & Plan:     1. Routine physical examination  -     HEPATITIS C AB; Future  -     METABOLIC PANEL, COMPREHENSIVE; Future  -     CBC W/O DIFF; Future  -     LIPID PANEL; Future  2. POTS (postural orthostatic tachycardia syndrome)  Assessment & Plan:  Slightly worse, minimally symptomatic, reviewed to increase salt intake, reviewed when to f/u with nephrology, she is aware of red flags   3. Anxiety  Assessment & Plan:  Intermittent, reports using meds at night, 1/2 tab maybe once every 2 wks to every 2-3 months. Mostly around stress w/ work & kids. Will provide 5 tabs to use prn and will monitor usage. Reviewed alternative life style stress relief techniques and she is aware of these.  reviewed, nothing in last 2 yrs   Orders:  -     ALPRAZolam (XANAX) 0.5 mg tablet; Take 0.5-1 Tabs by mouth as needed for Anxiety. Max Daily Amount: 48 mg., Normal, Disp-5 Tab, R-0    Follow-up and Dispositions    · Return in about 2 years (around 5/14/2023) for FULL PHYSICAL - 30 minutes. Subjective:   Oscar Nelson is here today for a full physical.      Since last visit: thinks her POTS is acting up slightly. Had issues after COVID vaccine but resolved. Not sure if it is allergies or POTS. No falls. Having symptoms she had when this was first diagnosed. 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 Health Maintenance  Immunizations:   Influenza: up to date   Tetanus: up to date    Cancer screening:   Cervical: reviewed guidelines, UTD, done by OBGYN, records requested  Breast: reviewed guidelines, up to date. The following sections were reviewed & updated as appropriate: Problem List, Allergies, PMH, PSH, FH, and SH. Prior to Admission medications    Medication Sig Start Date End Date Taking?  Authorizing Provider   omeprazole (PRILOSEC) 20 mg capsule Take 20 mg by mouth daily. 5/7/21 5/21/21 Yes Provider, Historical   ALPRAZolam (XANAX) 0.5 mg tablet Take 0.25 mg by mouth as needed for Anxiety. Yes Provider, Historical   fluticasone propionate (FLONASE NA) by Nasal route. Yes Provider, Historical   vit A/vit C/vit E/zinc/copper (PRESERVISION AREDS PO) Take  by mouth. 5/14/21  Provider, Historical   OTHER Licorice daily  4/80/39  Provider, Historical          Review of Systems   Constitutional: Negative for chills and fever. Respiratory: Negative for cough and shortness of breath. Cardiovascular: Negative for chest pain and palpitations. Gastrointestinal: Positive for heartburn (having issues on/off, using omeprazole w/ good relief, knows triggers, plans to stop in 2 wks). Negative for abdominal pain, blood in stool, constipation, diarrhea, nausea and vomiting. Musculoskeletal: Positive for back pain (on/off, no trauma). Negative for joint pain and myalgias. Neurological: Positive for dizziness. Negative for tingling, focal weakness and headaches. Endo/Heme/Allergies: Does not bruise/bleed easily. Psychiatric/Behavioral: Negative for depression. The patient is nervous/anxious and has insomnia. Objective:   Physical Exam  Constitutional:       General: She is not in acute distress. Appearance: She is well-developed. HENT:      Right Ear: Tympanic membrane and ear canal normal.      Left Ear: Tympanic membrane and ear canal normal.      Ears:      Comments: Right ear is: 50% occluded with wet cerumen. Left ear is: 50% occluded with wet cerumen. Eyes:      Conjunctiva/sclera: Conjunctivae normal.   Neck:      Thyroid: No thyromegaly. Cardiovascular:      Rate and Rhythm: Regular rhythm. Heart sounds: Normal heart sounds. No murmur. Pulmonary:      Effort: Pulmonary effort is normal.      Breath sounds: Normal breath sounds.    Abdominal:      General: Bowel sounds are normal.      Palpations: Abdomen is soft. Tenderness: There is no abdominal tenderness. Musculoskeletal:      Right lower leg: No edema. Left lower leg: No edema. Lymphadenopathy:      Cervical: No cervical adenopathy.    Psychiatric:         Mood and Affect: Mood and affect normal.            Visit Vitals  BP (!) 87/60   Pulse 77   Temp 97.8 °F (36.6 °C) (Temporal)   Resp 14   Ht 5' 2.35\" (1.584 m)   Wt 134 lb (60.8 kg)   SpO2 98%   BMI 24.23 kg/m²          Jonna Cancino MD

## 2021-05-14 NOTE — PATIENT INSTRUCTIONS
Well Visit, Ages 25 to 48: Care Instructions Overview Well visits can help you stay healthy. Your doctor has checked your overall health and may have suggested ways to take good care of yourself. Your doctor also may have recommended tests. At home, you can help prevent illness with healthy eating, regular exercise, and other steps. Follow-up care is a key part of your treatment and safety. Be sure to make and go to all appointments, and call your doctor if you are having problems. It's also a good idea to know your test results and keep a list of the medicines you take. How can you care for yourself at home? · Get screening tests that you and your doctor decide on. Screening helps find diseases before any symptoms appear. · Eat healthy foods. Choose fruits, vegetables, whole grains, protein, and low-fat dairy foods. Limit fat, especially saturated fat. Reduce salt in your diet. · Limit alcohol. If you are a man, have no more than 2 drinks a day or 14 drinks a week. If you are a woman, have no more than 1 drink a day or 7 drinks a week. · Get at least 30 minutes of physical activity on most days of the week. Walking is a good choice. You also may want to do other activities, such as running, swimming, cycling, or playing tennis or team sports. Discuss any changes in your exercise program with your doctor. · Reach and stay at a healthy weight. This will lower your risk for many problems, such as obesity, diabetes, heart disease, and high blood pressure. · Do not smoke or allow others to smoke around you. If you need help quitting, talk to your doctor about stop-smoking programs and medicines. These can increase your chances of quitting for good. · Care for your mental health. It is easy to get weighed down by worry and stress. Learn strategies to manage stress, like deep breathing and mindfulness, and stay connected with your family and community.  If you find you often feel sad or hopeless, talk with your doctor. Treatment can help. · Talk to your doctor about whether you have any risk factors for sexually transmitted infections (STIs). You can help prevent STIs if you wait to have sex with a new partner (or partners) until you've each been tested for STIs. It also helps if you use condoms (male or female condoms) and if you limit your sex partners to one person who only has sex with you. Vaccines are available for some STIs, such as HPV. · Use birth control if it's important to you to prevent pregnancy. Talk with your doctor about the choices available and what might be best for you. · If you think you may have a problem with alcohol or drug use, talk to your doctor. This includes prescription medicines (such as amphetamines and opioids) and illegal drugs (such as cocaine and methamphetamine). Your doctor can help you figure out what type of treatment is best for you. · Protect your skin from too much sun. When you're outdoors from 10 a.m. to 4 p.m., stay in the shade or cover up with clothing and a hat with a wide brim. Wear sunglasses that block UV rays. Even when it's cloudy, put broad-spectrum sunscreen (SPF 30 or higher) on any exposed skin. · See a dentist one or two times a year for checkups and to have your teeth cleaned. · Wear a seat belt in the car. When should you call for help? Watch closely for changes in your health, and be sure to contact your doctor if you have any problems or symptoms that concern you. Where can you learn more? Go to http://www.[employer].com/ Enter P072 in the search box to learn more about \"Well Visit, Ages 25 to 48: Care Instructions. \" Current as of: May 27, 2020               Content Version: 12.8 © 5999-5465 [employer]. Care instructions adapted under license by [employer] (which disclaims liability or warranty for this information).  If you have questions about a medical condition or this instruction, always ask your healthcare professional. [place] [address] any warranty or liability for your use of this information.

## 2021-05-14 NOTE — ASSESSMENT & PLAN NOTE
Slightly worse, minimally symptomatic, reviewed to increase salt intake, reviewed when to f/u with nephrology, she is aware of red flags

## 2021-05-15 LAB
ALBUMIN SERPL-MCNC: 4.5 G/DL (ref 3.5–5)
ALBUMIN/GLOB SERPL: 1.2 {RATIO} (ref 1.1–2.2)
ALP SERPL-CCNC: 66 U/L (ref 45–117)
ALT SERPL-CCNC: 23 U/L (ref 12–78)
ANION GAP SERPL CALC-SCNC: 7 MMOL/L (ref 5–15)
AST SERPL-CCNC: 15 U/L (ref 15–37)
BILIRUB SERPL-MCNC: 1 MG/DL (ref 0.2–1)
BUN SERPL-MCNC: 11 MG/DL (ref 6–20)
BUN/CREAT SERPL: 18 (ref 12–20)
CALCIUM SERPL-MCNC: 9.6 MG/DL (ref 8.5–10.1)
CHLORIDE SERPL-SCNC: 100 MMOL/L (ref 97–108)
CHOLEST SERPL-MCNC: 247 MG/DL
CO2 SERPL-SCNC: 29 MMOL/L (ref 21–32)
CREAT SERPL-MCNC: 0.61 MG/DL (ref 0.55–1.02)
ERYTHROCYTE [DISTWIDTH] IN BLOOD BY AUTOMATED COUNT: 12.3 % (ref 11.5–14.5)
GLOBULIN SER CALC-MCNC: 3.9 G/DL (ref 2–4)
GLUCOSE SERPL-MCNC: 86 MG/DL (ref 65–100)
HCT VFR BLD AUTO: 43.3 % (ref 35–47)
HCV AB SERPL QL IA: NONREACTIVE
HCV COMMENT,HCGAC: NORMAL
HDLC SERPL-MCNC: 66 MG/DL
HDLC SERPL: 3.7 {RATIO} (ref 0–5)
HGB BLD-MCNC: 14 G/DL (ref 11.5–16)
LDLC SERPL CALC-MCNC: 167.6 MG/DL (ref 0–100)
MCH RBC QN AUTO: 29.6 PG (ref 26–34)
MCHC RBC AUTO-ENTMCNC: 32.3 G/DL (ref 30–36.5)
MCV RBC AUTO: 91.5 FL (ref 80–99)
NRBC # BLD: 0 K/UL (ref 0–0.01)
NRBC BLD-RTO: 0 PER 100 WBC
PLATELET # BLD AUTO: 349 K/UL (ref 150–400)
PMV BLD AUTO: 10.9 FL (ref 8.9–12.9)
POTASSIUM SERPL-SCNC: 4.4 MMOL/L (ref 3.5–5.1)
PROT SERPL-MCNC: 8.4 G/DL (ref 6.4–8.2)
RBC # BLD AUTO: 4.73 M/UL (ref 3.8–5.2)
SODIUM SERPL-SCNC: 136 MMOL/L (ref 136–145)
TRIGL SERPL-MCNC: 67 MG/DL (ref ?–150)
VLDLC SERPL CALC-MCNC: 13.4 MG/DL
WBC # BLD AUTO: 5.2 K/UL (ref 3.6–11)

## 2021-05-17 NOTE — PROGRESS NOTES
Results released to patient via [employer]. All labs are stable or at goal for her, except for worsening cholesterol. The ASCVD Risk score (Felton Valerio, et al., 2013) could not be calculated due to SBP < 90.   If increased is 0.2%

## 2021-09-14 DIAGNOSIS — E78.00 ELEVATED CHOLESTEROL: Primary | ICD-10-CM

## 2021-09-14 RX ORDER — ATORVASTATIN CALCIUM 10 MG/1
10 TABLET, FILM COATED ORAL DAILY
Qty: 90 TABLET | Refills: 0 | Status: SHIPPED | OUTPATIENT
Start: 2021-09-14 | End: 2021-12-22 | Stop reason: SDUPTHER

## 2021-11-12 ENCOUNTER — NURSE TRIAGE (OUTPATIENT)
Dept: OTHER | Facility: CLINIC | Age: 46
End: 2021-11-12

## 2021-11-12 ENCOUNTER — TELEPHONE (OUTPATIENT)
Dept: INTERNAL MEDICINE CLINIC | Age: 46
End: 2021-11-12

## 2021-11-12 ENCOUNTER — VIRTUAL VISIT (OUTPATIENT)
Dept: INTERNAL MEDICINE CLINIC | Age: 46
End: 2021-11-12
Payer: COMMERCIAL

## 2021-11-12 DIAGNOSIS — E78.00 ELEVATED CHOLESTEROL: Primary | ICD-10-CM

## 2021-11-12 DIAGNOSIS — R19.7 DIARRHEA, UNSPECIFIED TYPE: ICD-10-CM

## 2021-11-12 DIAGNOSIS — G44.219 EPISODIC TENSION-TYPE HEADACHE, NOT INTRACTABLE: ICD-10-CM

## 2021-11-12 DIAGNOSIS — F41.8 ANXIETY WITH DEPRESSION: Primary | ICD-10-CM

## 2021-11-12 PROCEDURE — 99214 OFFICE O/P EST MOD 30 MIN: CPT | Performed by: NURSE PRACTITIONER

## 2021-11-12 RX ORDER — SERTRALINE HYDROCHLORIDE 25 MG/1
25 TABLET, FILM COATED ORAL DAILY
Qty: 30 TABLET | Refills: 1 | Status: SHIPPED | OUTPATIENT
Start: 2021-11-12 | End: 2021-12-22 | Stop reason: SDUPTHER

## 2021-11-12 RX ORDER — ALPRAZOLAM 0.5 MG/1
.25-.5 TABLET ORAL
Qty: 5 TABLET | Refills: 0 | Status: SHIPPED | OUTPATIENT
Start: 2021-11-12 | End: 2021-12-22 | Stop reason: SDUPTHER

## 2021-11-12 NOTE — PROGRESS NOTES
Zoila Nicolas is a 40 y.o. female who was seen by synchronous (real-time) audio-video technology on 11/12/2021 for Headache and Stress      Assessment & Plan:   Diagnoses and all orders for this visit:    1. Anxiety with depression  Assessment & Plan:  Lengthy counseling with pt today. Support and education about related somatic complaints. Start Zoloft 25 mg daily at HS. May continue Xanax PRN- checked and no identified concerns-needed refill sent. Relaxation techniques and stress reduction measures recommended. Pt to follow up in 3 weeks or sooner if any problems. Verbal safety contract. Orders:  -     ALPRAZolam (XANAX) 0.5 mg tablet; Take 0.5-1 Tablets by mouth two (2) times daily as needed for Anxiety. Max Daily Amount: 1 mg.  -     sertraline (ZOLOFT) 25 mg tablet; Take 1 Tablet by mouth daily. 2. Episodic tension-type headache, not intractable  Comments:  Per hx does not sound like sinus related. Suspect tension HA. May continue OTC NSAIDS as directed PRN. Stress reduction/treatment underlying anxiety/depression. 3. Diarrhea, unspecified type  Comments:  Improving. Start probiotics daily. Continue Gatorade. BRAT diet for now. Follow-up and Dispositions    · Return in about 3 weeks (around 12/3/2021), or sooner if symptoms worsen or fail to improve, for Follow up. I spent at least 39 minutes on this visit with this established patient. 712  Subjective:   39 yo female who was seen today for complaint of HA's occurring X 1 month. [facility] and placed on Augmentin for sinus HA with presumed infection. After being on the Augmentin x 8 days stopped it due to onset of severe diarrhea and abdominal cramps. She had been eating yogurt but stopped it d/t thought making cramping worse. She has been taking Imodium up to 4 tablets daily PRN.  Yesterday after eating eggs with butter she had worsening of abd cramping and she took 2 pills of her sister's medication that she was prescribed for IBS cramping-apparently new medication that her Dr. Rupesh Mayes Petties on the capsule. Counseled pt that she should not be taking others meds-states she is aware and will not do that again. Woke up with HA again this morning, but it has since resolved. Reports cramping has resolved. Stools improving and now soft in nature-no longer liquid. She has been drinking Gatorade. Had COVID booster on 11/5/2021. Pt very emotionally labile with intermittent crying-reports feeling very stress and overwhelmed both at work and home. No SI/HI. Cannot handle fears of COVID and also worried about her very bad cholesterol that is \"worse\" than her other siblings and her father who has heart disease. She is worried about her health-had her children later in life and wants to be here for them. Has been trying to exercise and eat better and has lost 10 lbs. Her father, who is a retired physician, told her that he thought she was stressed and had her take a couple of doses of her Rosalba Parkinson is now out of medication. Denies vision changes, fevers, chills, sinus congestion, sinus pressure, CP, cough, SOB, N/V, myalgias, rash or skin changes. Prior to Admission medications    Medication Sig Start Date End Date Taking? Authorizing Provider   atorvastatin (LIPITOR) 10 mg tablet Take 1 Tablet by mouth daily. 9/14/21  Yes Shekhar Batres MD   ALPRAZolam Aldon Nones) 0.5 mg tablet Take 0.5-1 Tabs by mouth as needed for Anxiety. Max Daily Amount: 48 mg. 5/14/21  Yes Shekhar Batres MD   fluticasone propionate (FLONASE NA) by Nasal route.    Yes Provider, Historical     Patient Active Problem List   Diagnosis Code    Infertility, female N97.9    Anxiety F41.9    POTS (postural orthostatic tachycardia syndrome) I49.8    Anxiety with depression F41.8     No Known Allergies    ROS See HPI    Objective:     Patient-Reported Vitals 11/12/2021   Patient-Reported Weight 128.5lbs Patient-Reported Pulse 88   Patient-Reported Temperature 99.4 ear   Patient-Reported Systolic  400   Patient-Reported Diastolic 74   Patient-Reported LMP 11/8/21      General: alert, cooperative, emotionally labile   Mental  status: normal mood, behavior, speech, dress, motor activity, and thought processes, able to follow commands   HENT: NCAT   Neck: no visualized mass   Resp: no respiratory distress. No audible congestion. Talking full sentences. Neuro: no gross deficits   Skin: no discoloration or lesions of concern on visible areas   Psychiatric: sad and anxious affect with intermittent crying, consistent with stated mood, no evidence of hallucinations         We discussed the expected course, resolution and complications of the diagnosis(es) in detail. Medication risks, benefits, costs, interactions, and alternatives were discussed as indicated. I advised her to contact the office if her condition worsens, changes or fails to improve as anticipated. She expressed understanding with the diagnosis(es) and plan. Yarely Moses, was evaluated through a synchronous (real-time) audio-video encounter. The patient (or guardian if applicable) is aware that this is a billable service. Verbal consent to proceed has been obtained within the past 12 months. The visit was conducted pursuant to the emergency declaration under the [facility], [address] [place] [address] authority and the [employer] and [employer] General Act. Patient identification was verified, and a caregiver was present when appropriate. The patient was located in a state where the provider was credentialed to provide care.     CHRISTIANA Giang

## 2021-11-12 NOTE — ASSESSMENT & PLAN NOTE
Lengthy counseling with pt today. Support and education about related somatic complaints. Start Zoloft 25 mg daily at HS. May continue Xanax PRN- checked and no identified concerns-needed refill sent. Relaxation techniques and stress reduction measures recommended. Pt to follow up in 3 weeks or sooner if any problems. Verbal safety contract.

## 2021-11-12 NOTE — PROGRESS NOTES
Pt. States she has been having headaches x1 mo. And [facility] Tx with augmentin but developed diarrhea and stomach cramps so she stopped it. She was eating yougurt and using flonase daily and alternating tylenol with advil 600mg q8h. Pt. Went fri. 11/5/21 and got her covid booster. Yesterday she took 2 pills from her sister for stomach cramps ? Vicky, instructed to not take medications unless prescribed. Pt. Still has the headache and abd cramps, is pushing fluids. Pt. Was tearful earlier call and wanted to see Dr Calderon Cotter in person. Explained she can either return to the [facility] or have virtual visit. She is connected to her my chart portal now.

## 2021-11-12 NOTE — PATIENT INSTRUCTIONS
Adjustment Disorder: Care Instructions  Your Care Instructions     Adjustment disorder means that you have emotional or behavioral problems because of stress. But your response to the stress is far more severe than a normal response. It is severe enough to affect your work or social life and may cause depression and physical pains and problems. Events that may cause this response can include a divorce, money problems, or starting school or a new job. It might be anything that causes some stress. This disorder is most often a short-term problem. It happens within 3 months of the stressful event or change. If the response lasts longer than 6 months after the event ends, you may have a more serious disorder. Follow-up care is a key part of your treatment and safety. Be sure to make and go to all appointments, and call your doctor if you are having problems. It's also a good idea to know your test results and keep a list of the medicines you take. How can you care for yourself at home? · Go to all counseling sessions. Do not skip any because you are feeling better. · If your doctor prescribed medicines, take them exactly as prescribed. Call your doctor if you think you are having a problem with your medicine. You will get more details on the specific medicines your doctor prescribes. · Discuss the causes of your stress with a good friend or family member. Or you can join a support group for people with similar problems. Talking to others sometimes relieves stress. · Get at least 30 minutes of exercise on most days of the week. Walking is a good choice. You also may want to do other activities, such as running, swimming, cycling, or playing tennis or team sports. Relaxation techniques  Do relaxation exercises 10 to 20 minutes a day. You can play soothing, relaxing music while you do them, if you wish. · Tell others in your house that you are going to do your relaxation exercises.  Ask them not to disturb you.  · Find a comfortable, quiet place. · Lie down on your back, or sit with your back straight. · Focus on your breathing. Make it slow and steady. · Breathe in through your nose. Breathe out through either your nose or mouth. · Breathe deeply, filling up the area between your navel and your rib cage. Breathe so that your belly goes up and down. · Do not hold your breath. · Breathe like this for 5 to 10 minutes. Notice the feeling of calmness throughout your whole body. As you continue to breathe slowly and deeply, relax by doing these next steps for another 5 to 10 minutes:  · Tighten and relax each muscle group in your body. Start at your toes, and work your way up to your head. · Imagine your muscle groups relaxing and getting heavy. · Empty your mind of all thoughts. · Let yourself relax more and more deeply. · Be aware of the state of calmness that surrounds you. · When your relaxation time is over, you can bring yourself back to alertness by moving your fingers and toes. Then move your hands and feet. And then move your entire body. Sometimes people fall asleep during relaxation. But they most often wake up soon. · Always give yourself time to return to full alertness before you drive a car. Wait to do anything that might cause an accident if you are not fully alert. Never play a relaxation tape while you drive a car. When should you call for help? Call 911 anytime you think you may need emergency care. For example, call if:    · You feel you cannot stop from hurting yourself or someone else. Keep the numbers for these national suicide hotlines: 2-421-258-TALK (8-315.538.1519) and 9-872-FASZPIS (1-257.296.9049). If you or someone you know talks about suicide or feeling hopeless, get help right away.    Watch closely for changes in your health, and be sure to contact your doctor if:    · You have new anxiety, or your anxiety gets worse.     · You have been feeling sad, depressed, or hopeless or have lost interest in things that you usually enjoy.     · You do not get better as expected. Where can you learn more? Go to http://www.gray.com/  Enter R087 in the search box to learn more about \"Adjustment Disorder: Care Instructions. \"  Current as of: June 16, 2021               Content Version: 13.0  © 8003-3976 [employer]. Care instructions adapted under license by [employer] (which disclaims liability or warranty for this information). If you have questions about a medical condition or this instruction, always ask your healthcare professional. [place] [address] any warranty or liability for your use of this information.

## 2021-11-12 NOTE — TELEPHONE ENCOUNTER
Received call from Randall at [facility] with Red Flag Complaint. Brief description of triage: Pt with a severe headache that started today. Pain is a 9/10. Had been on Augmentin for a sinus infection and thought was having side effects-diarrhea and abdominal pain. Pt stopped taking the Augmentin on Wednesday. No diarrhea since then. Had horrible abdominal pain last night, none so far today. Triage indicates for patient to call back by PCP within 1 hour    Care advice provided, patient verbalizes understanding; denies any other questions or concerns; instructed to call back for any new or worsening symptoms. Writer provided warm transfer to Rodolfo at PCP office. Rodolfo spoke with Dianne Sapin RN , at the office. Call transferred to Dianne Spain. Attention Provider: Thank you for allowing me to participate in the care of your patient. The patient was connected to triage in response to information provided to the ECC. Please do not respond through this encounter as the response is not directed to a shared pool. Reason for Disposition   SEVERE headache (e.g., excruciating) and has had severe headaches before    Answer Assessment - Initial Assessment Questions  1. LOCATION: \"Where does it hurt?\"         2. ONSET: \"When did the headache start? \" (Minutes, hours or days)    diarrhea and abd pain on Sunday, Wednesday diarrhea got worse. Thursday felt better. Yesterday evening stomach pain came back and was horrible. Headache started today. 3. PATTERN: \"Does the pain come and go, or has it been constant since it started?\"     4. SEVERITY: \"How bad is the pain? \" and \"What does it keep you from doing? \"  (e.g., Scale 1-10; mild, moderate, or severe)    - MILD (1-3): doesn't interfere with normal activities     - MODERATE (4-7): interferes with normal activities or awakens from sleep     - SEVERE (8-10): excruciating pain, unable to do any normal activities        Pain today is a 9/10 for the headache.   No stomach pain today. 5. RECURRENT SYMPTOM: \"Have you ever had headaches before? \" If so, ask: \"When was the last time? \" and \"What happened that time? \"       Has Jarvis so has had dizziness and headaches    6. CAUSE: \"What do you think is causing the headache? \"     Was on Augmentin-treated for a sinus infection. Concerned about reaction. Stopped the medication on Wednesday, which was day 8. Also got covid booster and flu vaccine last Friday. 7. MIGRAINE: \"Have you been diagnosed with migraine headaches? \" If so, ask: \"Is this headache similar? \"     Has allergy induced migraines    8. HEAD INJURY: \"Has there been any recent injury to the head?\"        9. OTHER SYMPTOMS: \"Do you have any other symptoms? \" (fever, stiff neck, eye pain, sore throat, cold symptoms)   Diarrhea and abdominal pain. Diarrhea not as watery today. BM this morning not solid but not watery. Feeling dizzy this morning    10. PREGNANCY: \"Is there any chance you are pregnant? \" \"When was your last menstrual period? \"    Protocols used: HEADACHE-ADULT-[place]

## 2021-11-12 NOTE — TELEPHONE ENCOUNTER
Call to patient, identified with 2 identifiers. Advised of Dr. Dana Delaney' note and recommendations. I have canceled the appointment scheduled with Rolly Cardoza NP on 12/03/21 and scheduled with Dr. Dana Delaney on Wednesday, 12/22/21, at 9:45. There was no other availability until January. Told her I would need to check with Dr. Dana Delaney since this is not a regular opening and that I would call her back if I needed to reschedule. Am mailing her a lab slip. She is asking if she should restart her Lipitor. She is quite anxious about her health, worried about her headaches, doesn't know if related to taking the statins. Told her this is a lot of information I need to confirm with Dr. Dana Delaney and that we will be back in touch.

## 2021-11-12 NOTE — TELEPHONE ENCOUNTER
----- Message from Cyril Lancaster sent at 11/12/2021 12:18 PM EST -----  Subject: Referral Request    QUESTIONS   Reason for referral request? Needs to re test her cholesterol per PCP   Yossi Grover. Has the physician seen you for this condition before? Yes  Select a date? 2021-05-04  Select the Provider the patient wants to be referred to, if known (PCP or   Specialist)? Henry Adames   Preferred Specialist (if applicable)? Do you already have an appointment scheduled? Additional Information for Provider? Pt would like a call to f/u with HANSA Grover regarding labs.   ---------------------------------------------------------------------------  --------------  Phi Muse INFO  What is the best way for the office to contact you? OK to leave message on   voicemail  Preferred Call Back Phone Number?  [phone]

## 2021-11-12 NOTE — TELEPHONE ENCOUNTER
Labs ready, needs to be fasting, thru [facility]    She saw Lolly Vera today. Needs to f/u with me in 4-6 wks, needs to set this up now due to the holidays and my schedule.

## 2021-11-15 NOTE — TELEPHONE ENCOUNTER
Okay to keep appointment on Wed 9:45am.  She can try stopping statin to see if this is cause of her h/a.   Stay off until our f/u in Dec.

## 2021-11-15 NOTE — TELEPHONE ENCOUNTER
Call to patient, identified with 2 identifiers. Advised of Dr. Ileana Escalante' note and recommendations. She is okay with waiting to see Dr. Ileana Escalante until December. Does not need to reschedule with Jamil Montalvo. She will stay off the statin until appointment with Dr. Ileana Escalante. She was started on antibiotic and steroids by Dr. Rod Colón. Wanted to make sure Dr. Ileana Escalante got that message.

## 2021-11-15 NOTE — TELEPHONE ENCOUNTER
Before I call her back. I had already canceled appointment with Warren Lopez on 12/03/21. Do you want me to call her back and reschedule?

## 2021-11-27 LAB
ALBUMIN SERPL-MCNC: 4.5 G/DL (ref 3.8–4.8)
ALP SERPL-CCNC: 58 IU/L (ref 44–121)
ALT SERPL-CCNC: 18 IU/L (ref 0–32)
AST SERPL-CCNC: 16 IU/L (ref 0–40)
BILIRUB DIRECT SERPL-MCNC: 0.35 MG/DL (ref 0–0.4)
BILIRUB SERPL-MCNC: 1.9 MG/DL (ref 0–1.2)
CHOLEST SERPL-MCNC: 196 MG/DL (ref 100–199)
HDLC SERPL-MCNC: 52 MG/DL
LDLC SERPL CALC-MCNC: 131 MG/DL (ref 0–99)
PROT SERPL-MCNC: 7.1 G/DL (ref 6–8.5)
TRIGL SERPL-MCNC: 70 MG/DL (ref 0–149)
VLDLC SERPL CALC-MCNC: 13 MG/DL (ref 5–40)

## 2021-11-27 NOTE — TELEPHONE ENCOUNTER
Results released to patient via [employer]. LDL improved on medication. Bili is up slightly. She has f/u on 12/22 to review in more detail.

## 2021-12-22 ENCOUNTER — OFFICE VISIT (OUTPATIENT)
Dept: INTERNAL MEDICINE CLINIC | Age: 46
End: 2021-12-22
Payer: COMMERCIAL

## 2021-12-22 VITALS
SYSTOLIC BLOOD PRESSURE: 102 MMHG | OXYGEN SATURATION: 98 % | DIASTOLIC BLOOD PRESSURE: 68 MMHG | TEMPERATURE: 97.1 F | HEART RATE: 82 BPM | HEIGHT: 62 IN | RESPIRATION RATE: 14 BRPM | BODY MASS INDEX: 23.74 KG/M2 | WEIGHT: 129 LBS

## 2021-12-22 DIAGNOSIS — F41.8 ANXIETY WITH DEPRESSION: Primary | ICD-10-CM

## 2021-12-22 DIAGNOSIS — Z12.11 COLON CANCER SCREENING: ICD-10-CM

## 2021-12-22 DIAGNOSIS — E78.00 ELEVATED CHOLESTEROL: ICD-10-CM

## 2021-12-22 PROCEDURE — 99214 OFFICE O/P EST MOD 30 MIN: CPT | Performed by: INTERNAL MEDICINE

## 2021-12-22 RX ORDER — ALPRAZOLAM 0.5 MG/1
.25-.5 TABLET ORAL
Qty: 5 TABLET | Refills: 0 | Status: SHIPPED | OUTPATIENT
Start: 2021-12-22

## 2021-12-22 RX ORDER — TRAZODONE HYDROCHLORIDE 100 MG/1
50-100 TABLET ORAL
Qty: 30 TABLET | Refills: 2 | Status: SHIPPED | OUTPATIENT
Start: 2021-12-22 | End: 2022-02-08 | Stop reason: ALTCHOICE

## 2021-12-22 RX ORDER — ATORVASTATIN CALCIUM 10 MG/1
10 TABLET, FILM COATED ORAL DAILY
Qty: 90 TABLET | Refills: 0 | Status: SHIPPED | OUTPATIENT
Start: 2021-12-22 | End: 2022-04-14 | Stop reason: SDUPTHER

## 2021-12-22 RX ORDER — SERTRALINE HYDROCHLORIDE 50 MG/1
50 TABLET, FILM COATED ORAL DAILY
Qty: 90 TABLET | Refills: 0 | Status: SHIPPED | OUTPATIENT
Start: 2021-12-22 | End: 2022-02-08 | Stop reason: ALTCHOICE

## 2021-12-22 NOTE — PATIENT INSTRUCTIONS
List of Mental Health Providers:    Danielle Og  [phone]  ---------------------------------------------  [facility]  [address] [facility] [address] [facility]  [phone]  ---------------------------------------------  [address]  [address]  [phone]  ---------------------------------------------  [facility]  [address] [place] [address] [phone]  ---------------------------------------------  DR GRABIEL JENKINS [address]  [phone]    [place]    [address] [place] [address] [phone]  ---------------------------------------------  Rich Escobar 80  ---------------------------------------------  [facility]  [phone]  --------------------------------------------  [facility]  [address] [place] [address] Deb Flores  [phone]  [employer] [facility] [employer]

## 2021-12-22 NOTE — ASSESSMENT & PLAN NOTE
Poorly controlled anxiety (fluctuating control) but well controlled depression (per her), I reviewed treatment options with her, reviewed EAP, I recommended to see a counselor, I reviewed life style changes to help improve mood, following changes were made today: will increase SSRI, will add in Trazadone for sleep. Will refill Xanax but stressed only to use for panic attacks (not for sleep or generalized anxiety). Reviewed duration she may need medications. Reassured her.

## 2021-12-22 NOTE — PROGRESS NOTES
Rima Gonzalez is a 40 y.o. female who was seen in clinic today (2021). Assessment & Plan:   Below is the assessment and plan developed based on review of pertinent history, physical exam, labs, studies, and medications. 1. Anxiety with depression  Assessment & Plan:  Poorly controlled anxiety (fluctuating control) but well controlled depression (per her), I reviewed treatment options with her, reviewed EAP, I recommended to see a counselor, I reviewed life style changes to help improve mood, following changes were made today: will increase SSRI, will add in Trazadone for sleep. Will refill Xanax but stressed only to use for panic attacks (not for sleep or generalized anxiety). Reviewed duration she may need medications. Reassured her. Orders:  -     ALPRAZolam (XANAX) 0.5 mg tablet; Take 0.5-1 Tablets by mouth two (2) times daily as needed for Anxiety. Max Daily Amount: 1 mg., Normal, Disp-5 Tablet, R-0  -     sertraline (ZOLOFT) 50 mg tablet; Take 1 Tablet by mouth daily. , Normal, Disp-90 Tablet, R-0  -     traZODone (DESYREL) 100 mg tablet; Take 0.5-1 Tablets by mouth nightly., Normal, Disp-30 Tablet, R-2  2. Elevated cholesterol  Assessment & Plan:  Improved, at goal, continue current treatment    Orders:  -     atorvastatin (LIPITOR) 10 mg tablet; Take 1 Tablet by mouth daily. , Normal, Disp-90 Tablet, R-0  3. Colon cancer screening    Follow-up and Dispositions    · Return in about 6 weeks (around 2022) for Regular follow up. Subjective/Objective:   Ashley Young was seen today for Anxiety      Since last visit with me she saw Chantelle Amezcua in November started on zoloft. Since then Dr Iris Cowden put her on steroids & antibiotics for suspected sinusitis. Headaches have improved. She is seeing a chiropractor. Mental Health Review  Patient is seen for anxiety. She thinks starting medications has helped initially.    She reports over Thanksgiving her father got very sick (almost ) but has recovered & doing well. She is not sure why she is not over it. Daughter had croup and had an ER visit. Since these episodes her anxiety is worse. Denies depression. She is changing jobs, she is happy about this. She is starting to exercise again. She does not want to me on medications. Available GAD7 reviewed. Reports experiences the following side effects from the treatment: none. [facility] on [facility]/14 and 11/12. Cardiovascular Review  The patient has hyperlipidemia. She reports taking medications as instructed, no medication side effects noted. She generally follows a low fat low cholesterol diet, exercises regularly. Prior to Admission medications    Medication Sig Start Date End Date Taking? Authorizing Provider   ALPRAZolam Ida Basset) 0.5 mg tablet Take 0.5-1 Tablets by mouth two (2) times daily as needed for Anxiety. Max Daily Amount: 1 mg. 11/12/21  Yes CHRISTIANA Paula   sertraline (ZOLOFT) 25 mg tablet Take 1 Tablet by mouth daily. 11/12/21  Yes CHRISTIANA Paula   atorvastatin (LIPITOR) 10 mg tablet Take 1 Tablet by mouth daily. 9/14/21  Yes Hitesh Tay MD   fluticasone propionate (FLONASE NA) by Nasal route. Yes Provider, Historical        Physical Exam  Psychiatric:      Comments: Tearing up intermittently. Good eye contract. Well dressed.   Well groomed       Visit Vitals  /68   Pulse 82   Temp 97.1 °F (36.2 °C) (Temporal)   Resp 14   Ht 5' 2.35\" (1.584 m)   Wt 129 lb (58.5 kg)   LMP 12/04/2021   SpO2 98%   BMI 23.33 kg/m²       Erika Zarate MD

## 2022-01-07 DIAGNOSIS — F41.8 ANXIETY WITH DEPRESSION: ICD-10-CM

## 2022-01-07 RX ORDER — SERTRALINE HYDROCHLORIDE 25 MG/1
25 TABLET, FILM COATED ORAL DAILY
Qty: 30 TABLET | Refills: 1 | OUTPATIENT
Start: 2022-01-07

## 2022-01-07 NOTE — TELEPHONE ENCOUNTER
Requested Prescriptions     Pending Prescriptions Disp Refills    sertraline (ZOLOFT) 25 mg tablet 30 Tablet 1     Sig: Take 1 Tablet by mouth daily.                    [id number] - [address], [facility] [address] AT [address] [place] [address]  [phone]

## 2022-01-13 DIAGNOSIS — F41.8 ANXIETY WITH DEPRESSION: ICD-10-CM

## 2022-01-13 RX ORDER — SERTRALINE HYDROCHLORIDE 25 MG/1
25 TABLET, FILM COATED ORAL DAILY
Qty: 30 TABLET | Refills: 0 | OUTPATIENT
Start: 2022-01-13

## 2022-01-13 NOTE — TELEPHONE ENCOUNTER
Requested Prescriptions     Pending Prescriptions Disp Refills    sertraline (ZOLOFT) 25 mg tablet 30 Tablet 1     Sig: Take 1 Tablet by mouth daily.                  [id number] - [facility] [address] [place], [place] [address] AT [address] [place] [address] Road [id number]  [phone]

## 2022-01-17 RX ORDER — PREDNISONE 10 MG/1
TABLET ORAL
Qty: 30 TABLET | Refills: 0 | Status: SHIPPED | OUTPATIENT
Start: 2022-01-17 | End: 2022-01-29

## 2022-02-08 ENCOUNTER — OFFICE VISIT (OUTPATIENT)
Dept: INTERNAL MEDICINE CLINIC | Age: 47
End: 2022-02-08
Payer: COMMERCIAL

## 2022-02-08 VITALS
SYSTOLIC BLOOD PRESSURE: 104 MMHG | OXYGEN SATURATION: 100 % | HEART RATE: 75 BPM | TEMPERATURE: 97.5 F | DIASTOLIC BLOOD PRESSURE: 61 MMHG | WEIGHT: 128.4 LBS | BODY MASS INDEX: 22.75 KG/M2 | RESPIRATION RATE: 16 BRPM | HEIGHT: 63 IN

## 2022-02-08 DIAGNOSIS — E78.00 ELEVATED CHOLESTEROL: ICD-10-CM

## 2022-02-08 DIAGNOSIS — F41.8 ANXIETY WITH DEPRESSION: ICD-10-CM

## 2022-02-08 DIAGNOSIS — Z01.89 ENCOUNTER FOR TOBACCO USE SCREENING: ICD-10-CM

## 2022-02-08 DIAGNOSIS — Z13.1 DIABETES MELLITUS SCREENING: ICD-10-CM

## 2022-02-08 DIAGNOSIS — Z12.11 COLON CANCER SCREENING: ICD-10-CM

## 2022-02-08 DIAGNOSIS — G44.89 OTHER HEADACHE SYNDROME: Primary | ICD-10-CM

## 2022-02-08 PROCEDURE — 99213 OFFICE O/P EST LOW 20 MIN: CPT | Performed by: INTERNAL MEDICINE

## 2022-02-08 RX ORDER — MELATONIN
1000 DAILY
COMMUNITY

## 2022-02-08 RX ORDER — ASCORBIC ACID 500 MG
500 TABLET ORAL DAILY
COMMUNITY

## 2022-02-08 NOTE — PROGRESS NOTES
Chief Complaint   Patient presents with    Anxiety     6 wk follow up     Medication Check       1. Have you been to the ER, urgent care clinic since your last visit? Hospitalized since your last visit? No    2. Have you seen or consulted any other health care providers outside of the [address] [place] [address] since your last visit? Include any pap smears or colon screening.  No

## 2022-02-08 NOTE — ASSESSMENT & PLAN NOTE
New dx, has been on/off but now resolved, differential dx reviewed, and favor sinus related even though location is not typical.  Will start w/ Flonase & Neti pot during spring/fall. Reviewed when to see ENT or consider CT.   Reassured nothing to suggest CNS pathology at this time

## 2022-02-08 NOTE — ASSESSMENT & PLAN NOTE
Improved, not sure how much of this is situational, reviewed she needs to continue w/ life style changes and monitor for changes. Reviewed her herbal supplements vs Rx medications.

## 2022-03-18 PROBLEM — E78.00 ELEVATED CHOLESTEROL: Status: ACTIVE | Noted: 2021-12-22

## 2022-03-18 PROBLEM — G90.A POTS (POSTURAL ORTHOSTATIC TACHYCARDIA SYNDROME): Status: ACTIVE | Noted: 2018-04-25

## 2022-03-19 PROBLEM — F41.8 ANXIETY WITH DEPRESSION: Status: ACTIVE | Noted: 2021-11-12

## 2022-03-20 PROBLEM — G44.89 OTHER HEADACHE SYNDROME: Status: ACTIVE | Noted: 2022-02-08

## 2022-04-13 ENCOUNTER — PATIENT MESSAGE (OUTPATIENT)
Dept: INTERNAL MEDICINE CLINIC | Age: 47
End: 2022-04-13

## 2022-04-13 DIAGNOSIS — E78.00 ELEVATED CHOLESTEROL: ICD-10-CM

## 2022-04-14 RX ORDER — ATORVASTATIN CALCIUM 10 MG/1
10 TABLET, FILM COATED ORAL DAILY
Qty: 90 TABLET | Refills: 3 | Status: SHIPPED | OUTPATIENT
Start: 2022-04-14

## 2022-08-30 ENCOUNTER — OFFICE VISIT (OUTPATIENT)
Dept: INTERNAL MEDICINE CLINIC | Age: 47
End: 2022-08-30
Payer: COMMERCIAL

## 2022-08-30 VITALS
DIASTOLIC BLOOD PRESSURE: 68 MMHG | RESPIRATION RATE: 18 BRPM | HEART RATE: 83 BPM | TEMPERATURE: 98.3 F | SYSTOLIC BLOOD PRESSURE: 104 MMHG | OXYGEN SATURATION: 98 % | WEIGHT: 136 LBS | BODY MASS INDEX: 24.1 KG/M2 | HEIGHT: 63 IN

## 2022-08-30 DIAGNOSIS — E06.1 SUBACUTE THYROIDITIS: Primary | ICD-10-CM

## 2022-08-30 DIAGNOSIS — E06.1 SUBACUTE THYROIDITIS: ICD-10-CM

## 2022-08-30 LAB
T4 FREE SERPL-MCNC: 1 NG/DL (ref 0.8–1.5)
TSH SERPL DL<=0.05 MIU/L-ACNC: 1.29 UIU/ML (ref 0.36–3.74)

## 2022-08-30 PROCEDURE — 99213 OFFICE O/P EST LOW 20 MIN: CPT | Performed by: INTERNAL MEDICINE

## 2022-08-30 NOTE — ASSESSMENT & PLAN NOTE
New dx to me, recurrent per her, differential reviewed, could be due to recent COVID infection and/or stress. Will check labs. Will schedule NSAIDs (ibuprofen) for today and tomorrow. We did review NSAIDs vs steroids based on symptoms. Did review getting into see endocrinologist, previous one retired, and will try to get set up with Dr Jose Alfredo Armando.

## 2022-08-30 NOTE — PROGRESS NOTES
Garry Suggs is a 56 y.o. female who was seen in clinic today (8/30/2022) for an acute visit. Assessment & Plan:   Below is the assessment and plan developed based on review of pertinent history, physical exam, labs, studies, and medications. 1. Subacute thyroiditis  Assessment & Plan:  New dx to me, recurrent per her, differential reviewed, could be due to recent COVID infection and/or stress. Will check labs. Will schedule NSAIDs (ibuprofen) for today and tomorrow. We did review NSAIDs vs steroids based on symptoms. Did review getting into see endocrinologist, previous one retired, and will try to get set up with Dr Marco Gutierrez. Orders:  -     T4, FREE; Future  -     TSH 3RD GENERATION; Future      Follow-up and Dispositions    Return if symptoms worsen or fail to improve. Subjective/Objective:   Nabil Quintanilla was seen today for Thyroid Problem    She woke up on Sunday (2 days ago) with neck pain. It hurt when she swallows. It was not a typical sore throat. She noticed her neck was more swollen. She does reports some increase in stress. COVID was in there house. In-laws were in town. Kids are back to school. She reports it is slightly improved (pain and swelling has improved). Energy level was down initially but is improving. She is able to do her normal daily activity. She has a h/o thyroiditis about 10-15 years ago. At that time thought to be stress related and was on xanax daily. Review of Systems   Constitutional:  Negative for chills and fever. HENT:  Negative for congestion, ear pain and sinus pain. Respiratory:  Negative for cough and shortness of breath. Cardiovascular:  Negative for chest pain and palpitations. Gastrointestinal:  Negative for abdominal pain, constipation, diarrhea, nausea and vomiting. Neurological:  Positive for dizziness. Negative for headaches. Prior to Admission medications    Medication Sig Start Date End Date Taking?  Authorizing Provider atorvastatin (LIPITOR) 10 mg tablet Take 1 Tablet by mouth daily. 4/14/22  Yes Mckayla Newton MD   ascorbic acid, vitamin C, (VITAMIN C) 500 mg tablet Take 500 mg by mouth daily. Yes Provider, Historical   cholecalciferol (VITAMIN D3) (1000 Units /25 mcg) tablet Take 1,000 Units by mouth daily. Yes Provider, Historical   butterbur root extract 75 mg cap Take 1 Capsule by mouth daily. Yes Provider, Historical   ALPRAZolam (XANAX) 0.5 mg tablet Take 0.5-1 Tablets by mouth two (2) times daily as needed for Anxiety. Max Daily Amount: 1 mg. 12/22/21  Yes Mckayla Newton MD   fluticasone propionate (FLONASE NA) by Nasal route. Yes Provider, Historical   zinc sulfate (ZINC-220 PO) Take 1 Capsule by mouth daily. Patient not taking: Reported on 8/30/2022    Provider, Historical   ASHWAGANDHA ROOT EXTRACT PO Take 1 Tablet by mouth daily. Patient not taking: Reported on 8/30/2022    Provider, Historical           Physical Exam  Neck:      Thyroid: Thyromegaly (R lobe, feels firm) present. No thyroid tenderness. Lymphadenopathy:      Cervical: No cervical adenopathy. Visit Vitals  /68   Pulse 83   Temp 98.3 °F (36.8 °C) (Temporal)   Resp 18   Ht 5' 2.5\" (1.588 m)   Wt 136 lb (61.7 kg)   LMP 06/17/2022   SpO2 98%   BMI 24.48 kg/m²         Advised her to call back or return to office if symptoms worsen/change/persist.  Discussed expected course/resolution/complications of diagnosis in detail with patient. Medication risks/benefits/costs/interactions/alternatives discussed with patient.     Beatriz Fontenot MD

## 2022-09-09 DIAGNOSIS — E06.1 SUBACUTE THYROIDITIS: Primary | ICD-10-CM

## 2022-09-12 DIAGNOSIS — Z86.39 HISTORY OF THYROIDITIS: Primary | ICD-10-CM

## 2022-09-12 DIAGNOSIS — E06.1 SUBACUTE THYROIDITIS: Primary | ICD-10-CM

## 2022-09-13 LAB
ALBUMIN SERPL-MCNC: 4.1 G/DL (ref 3.8–4.8)
ALBUMIN/GLOB SERPL: 1.5 {RATIO} (ref 1.2–2.2)
ALP SERPL-CCNC: 50 IU/L (ref 44–121)
ALT SERPL-CCNC: 13 IU/L (ref 0–32)
AST SERPL-CCNC: 12 IU/L (ref 0–40)
BILIRUB SERPL-MCNC: 0.9 MG/DL (ref 0–1.2)
BUN SERPL-MCNC: 7 MG/DL (ref 6–24)
BUN/CREAT SERPL: 11 (ref 9–23)
CALCIUM SERPL-MCNC: 8.9 MG/DL (ref 8.7–10.2)
CHLORIDE SERPL-SCNC: 101 MMOL/L (ref 96–106)
CHOLEST SERPL-MCNC: 145 MG/DL (ref 100–199)
CO2 SERPL-SCNC: 22 MMOL/L (ref 20–29)
COTININE UR QL SCN: NEGATIVE NG/ML
CREAT SERPL-MCNC: 0.61 MG/DL (ref 0.57–1)
DRUG SCREEN COMMENT:, 753798: NORMAL
EGFR: 112 ML/MIN/1.73
EST. AVERAGE GLUCOSE BLD GHB EST-MCNC: 108 MG/DL
GLOBULIN SER CALC-MCNC: 2.8 G/DL (ref 1.5–4.5)
GLUCOSE SERPL-MCNC: 93 MG/DL (ref 65–99)
HBA1C MFR BLD: 5.4 % (ref 4.8–5.6)
HDLC SERPL-MCNC: 66 MG/DL
LDLC SERPL CALC-MCNC: 66 MG/DL (ref 0–99)
POTASSIUM SERPL-SCNC: 4 MMOL/L (ref 3.5–5.2)
PROT SERPL-MCNC: 6.9 G/DL (ref 6–8.5)
SODIUM SERPL-SCNC: 138 MMOL/L (ref 134–144)
TRIGL SERPL-MCNC: 63 MG/DL (ref 0–149)
VLDLC SERPL CALC-MCNC: 13 MG/DL (ref 5–40)

## 2022-09-14 NOTE — PROGRESS NOTES
Results released to patient via [employer]. All labs are stable or at goal for her. Ordered in Feb, completed in September.

## 2022-09-16 ENCOUNTER — HOSPITAL ENCOUNTER (OUTPATIENT)
Dept: ULTRASOUND IMAGING | Age: 47
Discharge: HOME | End: 2022-09-16
Attending: INTERNAL MEDICINE
Payer: COMMERCIAL

## 2022-09-16 DIAGNOSIS — E06.1 SUBACUTE THYROIDITIS: ICD-10-CM

## 2022-09-16 PROCEDURE — 76536 US EXAM OF HEAD AND NECK: CPT

## 2022-09-19 NOTE — PROGRESS NOTES
Results reviewed. Nodule is unlikely the cause of her discomfort. She is planning on scheduling with endocrine.

## 2022-09-20 ENCOUNTER — DOCUMENTATION ONLY (OUTPATIENT)
Dept: INTERNAL MEDICINE CLINIC | Age: 47
End: 2022-09-20

## 2022-09-20 RX ORDER — METHYLPREDNISOLONE 4 MG/1
TABLET ORAL
Qty: 1 DOSE PACK | Refills: 0 | Status: SHIPPED | OUTPATIENT
Start: 2022-09-20 | End: 2022-09-26

## 2022-09-20 NOTE — PROGRESS NOTES
Faxed referral/ order [facility] @[phone] . Received confirmation same day. Will have documents scanned into chart. . please advise Sylvie Martinez

## 2023-01-31 NOTE — PROGRESS NOTES
Melanie Blair is a 40 y.o. female who was seen in clinic today (2/8/2022). She brought in [address] [place] [address] for her new job. Assessment & Plan:   Below is the assessment and plan developed based on review of pertinent history, physical exam, labs, studies, and medications. 1. Other headache syndrome  Assessment & Plan:  New dx, has been on/off but now resolved, differential dx reviewed, and favor sinus related even though location is not typical.  Will start w/ Flonase & Neti pot during spring/fall. Reviewed when to see ENT or consider CT. Reassured nothing to suggest CNS pathology at this time   2. Anxiety with depression  Assessment & Plan:  Improved, not sure how much of this is situational, reviewed she needs to continue w/ life style changes and monitor for changes. Reviewed her herbal supplements vs Rx medications. 3. Encounter for tobacco use screening  -     NICOTINE/COTININE, UR, QT; Future  4. Elevated cholesterol  -     METABOLIC PANEL, COMPREHENSIVE; Future  -     LIPID PANEL; Future  5. Diabetes mellitus screening  -     METABOLIC PANEL, COMPREHENSIVE; Future  -     HEMOGLOBIN A1C WITH EAG; Future  6. Colon cancer screening  -     REFERRAL TO GASTROENTEROLOGY    Follow-up and Dispositions    · Return in about 1 year (around 2/8/2023) for FULL PHYSICAL - 30 minutes. Subjective/Objective:   Louie Adam was seen today for Anxiety (6 wk follow up ) and Medication Check      At last visit zoloft dose increased from 25mg to 50mg. Since last visit: she weaned off the zoloft as she felt it was impacting her sleep. Has started taking multiple vitamins and herbal remedies. URI Review  Louie Adam returns to clinic today to talk about recurrent sinus infections. She has had 3 rounds of steroids and antibiotic since September. She is not sure if this was once untreated or recurrent infections. She reports having similar issues a few years ago.   Normally allergies are more in the spring. Mental Health Review  Patient is seen for anxiety & depression. She has a new job, working for [employer]. Multiple family stressors (father's health and kid's health). She is exercising & doing yoga again. She feels better off the zoloft. Any available GAD7 and PHQ9 reviewed. Prior to Admission medications    Medication Sig Start Date End Date Taking? Authorizing Provider   ALPRAZolam Casper Corrie) 0.5 mg tablet Take 0.5-1 Tablets by mouth two (2) times daily as needed for Anxiety. Max Daily Amount: 1 mg. 12/22/21  Yes Mitali Szymanski MD   atorvastatin (LIPITOR) 10 mg tablet Take 1 Tablet by mouth daily. 12/22/21  Yes Mitali Szymanski MD   fluticasone propionate (FLONASE NA) by Nasal route. Yes Provider, Historical   sertraline (ZOLOFT) 50 mg tablet Take 1 Tablet by mouth daily. 12/22/21 2/8/22  Mitali Szymanski MD   traZODone (DESYREL) 100 mg tablet Take 0.5-1 Tablets by mouth nightly. 12/22/21 2/8/22  Mitali Szymanski MD        ROS     Physical Exam  HENT:      Right Ear: Ear canal normal. No middle ear effusion. Tympanic membrane is not erythematous. Left Ear: Ear canal normal.  No middle ear effusion. Tympanic membrane is not erythematous. Ears:      Comments: Right ear is: 25% occluded with wet cerumen. Nose: No congestion or rhinorrhea. Right Turbinates: Not swollen. Left Turbinates: Not swollen. Right Sinus: No maxillary sinus tenderness or frontal sinus tenderness. Left Sinus: No maxillary sinus tenderness or frontal sinus tenderness. Mouth/Throat:      Mouth: Mucous membranes are moist.      Pharynx: No oropharyngeal exudate or posterior oropharyngeal erythema. Cardiovascular:      Rate and Rhythm: Regular rhythm. Heart sounds: No murmur heard. Pulmonary:      Effort: Pulmonary effort is normal.      Breath sounds: No decreased breath sounds or wheezing. Lymphadenopathy:      Cervical: No cervical adenopathy. Visit Vitals  /61 (BP 1 Location: Left upper arm, BP Patient Position: Sitting, BP Cuff Size: Adult)   Pulse 75   Temp 97.5 °F (36.4 °C) (Temporal)   Resp 16   Ht 5' 2.5\" (1.588 m)   Wt 128 lb 6.4 oz (58.2 kg)   LMP 01/21/2022   SpO2 100%   BMI 23.11 kg/m²       Gerber Stapleton MD Ketoconazole Pregnancy And Lactation Text: This medication is Pregnancy Category C and it isn't know if it is safe during pregnancy. It is also excreted in breast milk and breast feeding isn't recommended.

## 2023-02-17 ENCOUNTER — OFFICE VISIT (OUTPATIENT)
Dept: INTERNAL MEDICINE CLINIC | Age: 48
End: 2023-02-17
Payer: COMMERCIAL

## 2023-02-17 VITALS
HEIGHT: 63 IN | HEART RATE: 79 BPM | DIASTOLIC BLOOD PRESSURE: 58 MMHG | SYSTOLIC BLOOD PRESSURE: 94 MMHG | TEMPERATURE: 97.9 F | WEIGHT: 138 LBS | OXYGEN SATURATION: 98 % | BODY MASS INDEX: 24.45 KG/M2 | RESPIRATION RATE: 18 BRPM

## 2023-02-17 DIAGNOSIS — E78.00 ELEVATED CHOLESTEROL: ICD-10-CM

## 2023-02-17 DIAGNOSIS — F41.9 ANXIETY: ICD-10-CM

## 2023-02-17 DIAGNOSIS — E06.1 SUBACUTE THYROIDITIS: ICD-10-CM

## 2023-02-17 DIAGNOSIS — Z00.00 ROUTINE PHYSICAL EXAMINATION: Primary | ICD-10-CM

## 2023-02-17 RX ORDER — ALPRAZOLAM 0.5 MG/1
.25-.5 TABLET ORAL
Qty: 5 TABLET | Refills: 0 | Status: SHIPPED | OUTPATIENT
Start: 2023-02-17

## 2023-02-17 RX ORDER — AA/PROT/LYSINE/METHIO/VIT C/B6 50-12.5 MG
TABLET ORAL
COMMUNITY

## 2023-02-17 RX ORDER — ATORVASTATIN CALCIUM 10 MG/1
10 TABLET, FILM COATED ORAL DAILY
Qty: 90 TABLET | Refills: 3 | Status: SHIPPED | OUTPATIENT
Start: 2023-02-17

## 2023-02-17 NOTE — PROGRESS NOTES
Jenny Pearce is a 56 y.o. female who was seen in clinic today (2/17/2023) for a full physical.        Assessment & Plan:   Below is the assessment and plan developed based on review of pertinent history, physical exam, labs, studies, and medications. 1. Routine physical examination  Comments:  labs from Sept '22 reviewed  Orders:  -     TSH 3RD GENERATION; Future  -     T4, FREE; Future  -     METABOLIC PANEL, COMPREHENSIVE; Future  -     LIPID PANEL; Future  2. Elevated cholesterol  Assessment & Plan:  well controlled, continue current treatment pending review of labs, meds refilled assuming labs look good   Orders:  -     METABOLIC PANEL, COMPREHENSIVE; Future  -     LIPID PANEL; Future  -     atorvastatin (LIPITOR) 10 mg tablet; Take 1 Tablet by mouth daily. , Normal, Disp-90 Tablet, R-3  3. Subacute thyroiditis  Assessment & Plan:  Improved, asymptomatic for the most part, rare neck pain, has evaluation w/ endocrine next week, will check labs   Orders:  -     TSH 3RD GENERATION; Future  -     T4, FREE; Future  4. Anxiety  Assessment & Plan:  well controlled, I reviewed treatment options with her, I reviewed life style changes to help improve mood, continue [facility] treatment. [facility], 5 tabs provided on 12/22/21, meds refilled. Orders:  -     ALPRAZolam (XANAX) 0.5 mg tablet; Take 0.5-1 Tablets by mouth two (2) times daily as needed for Anxiety. Max Daily Amount: 1 mg., Normal, Disp-5 Tablet, R-0    Follow-up and Dispositions    Return in about 1 year (around 2/17/2024) for FULL PHYSICAL. Subjective:   Marily Morrison is here today for a full physical.      Since last visit:  started back dancing ([employer])     Health Maintenance  Immunizations:   Covid: not up to date - declined  Influenza: up to date   Tetanus: up to date    Cancer screening:   Cervical: reviewed guidelines, up to date  Breast: reviewed guidelines, UTD, done by OBGYN, records requested.    Colon: up to date      The following sections were reviewed & updated as appropriate: Problem List, Allergies, PMH, PSH, FH, and SH. Prior to Admission medications    Medication Sig Start Date End Date Taking? Authorizing Provider   coenzyme q10 10 mg cap Take  by mouth. Yes Provider, Historical   atorvastatin (LIPITOR) 10 mg tablet Take 1 Tablet by mouth daily. 4/14/22  Yes Heber MD Katherine   ascorbic acid, vitamin C, (VITAMIN C) 500 mg tablet Take 500 mg by mouth daily. Yes Provider, Historical   cholecalciferol (VITAMIN D3) (1000 Units /25 mcg) tablet Take 1,000 Units by mouth daily. Yes Provider, Historical   ALPRAZolam (XANAX) 0.5 mg tablet Take 0.5-1 Tablets by mouth two (2) times daily as needed for Anxiety. Max Daily Amount: 1 mg. 12/22/21  Yes Heber MD Katherine   fluticasone propionate (FLONASE NA) by Nasal route. Yes Provider, Historical   zinc sulfate (ZINC-220 PO) Take 1 Capsule by mouth daily. Patient not taking: No sig reported  2/17/23  Provider, Historical   butterbur root extract 75 mg cap Take 1 Capsule by mouth daily. 2/17/23  Provider, Historical   ASHWAGANDHA ROOT EXTRACT PO Take 1 Tablet by mouth daily. Patient not taking: No sig reported  2/17/23  Provider, Historical          Review of Systems   Constitutional:  Negative for chills and fever. Respiratory:  Negative for cough and shortness of breath. Cardiovascular:  Negative for chest pain and palpitations. Gastrointestinal:  Negative for abdominal pain, blood in stool, constipation, diarrhea, heartburn, nausea and vomiting. Musculoskeletal:  Negative for joint pain and myalgias. Neurological:  Negative for tingling, focal weakness and headaches. Endo/Heme/Allergies:  Does not bruise/bleed easily. Psychiatric/Behavioral:  Negative for depression. The patient is not nervous/anxious and does not have insomnia. Objective:   Physical Exam  Constitutional:       General: She is not in acute distress.      Appearance: She is well-developed. HENT:      Right Ear: Tympanic membrane and ear canal normal.      Left Ear: Tympanic membrane and ear canal normal.      Ears:      Comments: Left ear is: 50% occluded with hard cerumen. Eyes:      Conjunctiva/sclera: Conjunctivae normal.   Cardiovascular:      Rate and Rhythm: Regular rhythm. Heart sounds: Normal heart sounds. No murmur heard. Pulmonary:      Effort: Pulmonary effort is normal.      Breath sounds: Normal breath sounds. Abdominal:      General: Bowel sounds are normal.      Palpations: Abdomen is soft. There is no hepatomegaly or splenomegaly. Tenderness: There is no abdominal tenderness. Musculoskeletal:      Right lower leg: No edema. Left lower leg: No edema. Lymphadenopathy:      Cervical: No cervical adenopathy.    Psychiatric:         Mood and Affect: Mood and affect normal.          Visit Vitals  BP (!) 94/58   Pulse 79   Temp 97.9 °F (36.6 °C) (Temporal)   Resp 18   Ht 5' 2.5\" (1.588 m)   Wt 138 lb (62.6 kg)   LMP 01/28/2023   SpO2 98%   BMI 24.84 kg/m²          Bhaskar Flores MD

## 2023-02-17 NOTE — ASSESSMENT & PLAN NOTE
well controlled, continue current treatment pending review of labs, meds refilled assuming labs look good

## 2023-02-17 NOTE — ASSESSMENT & PLAN NOTE
Improved, asymptomatic for the most part, rare neck pain, has evaluation w/ endocrine next week, will check labs

## 2023-02-17 NOTE — ASSESSMENT & PLAN NOTE
well controlled, I reviewed treatment options with her, I reviewed life style changes to help improve mood, continue current treatment. [facility], 5 tabs provided on 12/22/21, meds refilled.

## 2023-02-18 LAB
ALBUMIN SERPL-MCNC: 4.2 G/DL (ref 3.5–5)
ALBUMIN/GLOB SERPL: 1.2 (ref 1.1–2.2)
ALP SERPL-CCNC: 48 U/L (ref 45–117)
ALT SERPL-CCNC: 20 U/L (ref 12–78)
ANION GAP SERPL CALC-SCNC: 5 MMOL/L (ref 5–15)
AST SERPL-CCNC: 15 U/L (ref 15–37)
BILIRUB SERPL-MCNC: 1.1 MG/DL (ref 0.2–1)
BUN SERPL-MCNC: 12 MG/DL (ref 6–20)
BUN/CREAT SERPL: 18 (ref 12–20)
CALCIUM SERPL-MCNC: 9.9 MG/DL (ref 8.5–10.1)
CHLORIDE SERPL-SCNC: 104 MMOL/L (ref 97–108)
CHOLEST SERPL-MCNC: 152 MG/DL
CO2 SERPL-SCNC: 28 MMOL/L (ref 21–32)
CREAT SERPL-MCNC: 0.67 MG/DL (ref 0.55–1.02)
GLOBULIN SER CALC-MCNC: 3.4 G/DL (ref 2–4)
GLUCOSE SERPL-MCNC: 98 MG/DL (ref 65–100)
HDLC SERPL-MCNC: 69 MG/DL
HDLC SERPL: 2.2 (ref 0–5)
LDLC SERPL CALC-MCNC: 73.6 MG/DL (ref 0–100)
POTASSIUM SERPL-SCNC: 4.5 MMOL/L (ref 3.5–5.1)
PROT SERPL-MCNC: 7.6 G/DL (ref 6.4–8.2)
SODIUM SERPL-SCNC: 137 MMOL/L (ref 136–145)
T4 FREE SERPL-MCNC: 1.2 NG/DL (ref 0.8–1.5)
TRIGL SERPL-MCNC: 47 MG/DL (ref ?–150)
TSH SERPL DL<=0.05 MIU/L-ACNC: 1.64 UIU/ML (ref 0.36–3.74)
VLDLC SERPL CALC-MCNC: 9.4 MG/DL

## 2023-02-20 NOTE — PROGRESS NOTES
Results released to patient via [employer]. All labs are stable or at goal for her.   Please fax copy of labs to Dr Fleeta Essex (endocrine)

## 2023-02-24 ENCOUNTER — TELEPHONE (OUTPATIENT)
Dept: INTERNAL MEDICINE CLINIC | Age: 48
End: 2023-02-24

## 2023-07-03 NOTE — TELEPHONE ENCOUNTER
Future Appointments   Date Time Provider Cait Praveena   2/8/2022  2:00 PM Donnie Rosales MD [facility]   3/22/2022  9:00 AM Donnie Rosales MD [facility] no no

## 2024-03-01 ENCOUNTER — OFFICE VISIT (OUTPATIENT)
Age: 49
End: 2024-03-01
Payer: COMMERCIAL

## 2024-03-01 ENCOUNTER — TELEPHONE (OUTPATIENT)
Age: 49
End: 2024-03-01

## 2024-03-01 VITALS
SYSTOLIC BLOOD PRESSURE: 99 MMHG | WEIGHT: 141.2 LBS | HEIGHT: 62 IN | DIASTOLIC BLOOD PRESSURE: 66 MMHG | BODY MASS INDEX: 25.98 KG/M2 | RESPIRATION RATE: 16 BRPM | TEMPERATURE: 97.8 F | HEART RATE: 80 BPM | OXYGEN SATURATION: 98 %

## 2024-03-01 DIAGNOSIS — F41.9 ANXIETY: ICD-10-CM

## 2024-03-01 DIAGNOSIS — E78.00 PURE HYPERCHOLESTEROLEMIA: ICD-10-CM

## 2024-03-01 DIAGNOSIS — G90.A POTS (POSTURAL ORTHOSTATIC TACHYCARDIA SYNDROME): ICD-10-CM

## 2024-03-01 DIAGNOSIS — Z00.00 ROUTINE PHYSICAL EXAMINATION: ICD-10-CM

## 2024-03-01 DIAGNOSIS — Z00.00 ROUTINE PHYSICAL EXAMINATION: Primary | ICD-10-CM

## 2024-03-01 DIAGNOSIS — Z83.518 FAMILY HISTORY OF EYE DISORDER: ICD-10-CM

## 2024-03-01 DIAGNOSIS — E06.1 SUBACUTE THYROIDITIS: ICD-10-CM

## 2024-03-01 LAB
ALBUMIN SERPL-MCNC: 4 G/DL (ref 3.5–5)
ALBUMIN/GLOB SERPL: 1.1 (ref 1.1–2.2)
ALP SERPL-CCNC: 53 U/L (ref 45–117)
ALT SERPL-CCNC: 35 U/L (ref 12–78)
ANION GAP SERPL CALC-SCNC: 2 MMOL/L (ref 5–15)
AST SERPL-CCNC: 22 U/L (ref 15–37)
BILIRUB SERPL-MCNC: 1.2 MG/DL (ref 0.2–1)
BUN SERPL-MCNC: 14 MG/DL (ref 6–20)
BUN/CREAT SERPL: 22 (ref 12–20)
CALCIUM SERPL-MCNC: 9.6 MG/DL (ref 8.5–10.1)
CHLORIDE SERPL-SCNC: 102 MMOL/L (ref 97–108)
CHOLEST SERPL-MCNC: 155 MG/DL
CO2 SERPL-SCNC: 30 MMOL/L (ref 21–32)
CREAT SERPL-MCNC: 0.64 MG/DL (ref 0.55–1.02)
ERYTHROCYTE [DISTWIDTH] IN BLOOD BY AUTOMATED COUNT: 12.5 % (ref 11.5–14.5)
GLOBULIN SER CALC-MCNC: 3.6 G/DL (ref 2–4)
GLUCOSE SERPL-MCNC: 82 MG/DL (ref 65–100)
HCT VFR BLD AUTO: 39 % (ref 35–47)
HDLC SERPL-MCNC: 77 MG/DL
HDLC SERPL: 2 (ref 0–5)
HGB BLD-MCNC: 12.7 G/DL (ref 11.5–16)
LDLC SERPL CALC-MCNC: 69.8 MG/DL (ref 0–100)
MCH RBC QN AUTO: 29.6 PG (ref 26–34)
MCHC RBC AUTO-ENTMCNC: 32.6 G/DL (ref 30–36.5)
MCV RBC AUTO: 90.9 FL (ref 80–99)
NRBC # BLD: 0 K/UL (ref 0–0.01)
NRBC BLD-RTO: 0 PER 100 WBC
PLATELET # BLD AUTO: 326 K/UL (ref 150–400)
PMV BLD AUTO: 10.5 FL (ref 8.9–12.9)
POTASSIUM SERPL-SCNC: 4.2 MMOL/L (ref 3.5–5.1)
PROT SERPL-MCNC: 7.6 G/DL (ref 6.4–8.2)
RBC # BLD AUTO: 4.29 M/UL (ref 3.8–5.2)
SODIUM SERPL-SCNC: 134 MMOL/L (ref 136–145)
TRIGL SERPL-MCNC: 41 MG/DL
VLDLC SERPL CALC-MCNC: 8.2 MG/DL
WBC # BLD AUTO: 6.6 K/UL (ref 3.6–11)

## 2024-03-01 PROCEDURE — 99396 PREV VISIT EST AGE 40-64: CPT | Performed by: INTERNAL MEDICINE

## 2024-03-01 RX ORDER — ATORVASTATIN CALCIUM 10 MG/1
10 TABLET, FILM COATED ORAL DAILY
Qty: 90 TABLET | Refills: 3 | Status: SHIPPED | OUTPATIENT
Start: 2024-03-01

## 2024-03-01 RX ORDER — ALPRAZOLAM 0.5 MG/1
.25-.5 TABLET ORAL 2 TIMES DAILY PRN
Qty: 5 TABLET | Refills: 0 | Status: SHIPPED | OUTPATIENT
Start: 2024-03-01 | End: 2025-03-01

## 2024-03-01 SDOH — ECONOMIC STABILITY: FOOD INSECURITY: WITHIN THE PAST 12 MONTHS, THE FOOD YOU BOUGHT JUST DIDN'T LAST AND YOU DIDN'T HAVE MONEY TO GET MORE.: NEVER TRUE

## 2024-03-01 SDOH — ECONOMIC STABILITY: INCOME INSECURITY: HOW HARD IS IT FOR YOU TO PAY FOR THE VERY BASICS LIKE FOOD, HOUSING, MEDICAL CARE, AND HEATING?: NOT HARD AT ALL

## 2024-03-01 SDOH — ECONOMIC STABILITY: HOUSING INSECURITY
IN THE LAST 12 MONTHS, WAS THERE A TIME WHEN YOU DID NOT HAVE A STEADY PLACE TO SLEEP OR SLEPT IN A SHELTER (INCLUDING NOW)?: NO

## 2024-03-01 SDOH — ECONOMIC STABILITY: FOOD INSECURITY: WITHIN THE PAST 12 MONTHS, YOU WORRIED THAT YOUR FOOD WOULD RUN OUT BEFORE YOU GOT MONEY TO BUY MORE.: NEVER TRUE

## 2024-03-01 ASSESSMENT — PATIENT HEALTH QUESTIONNAIRE - PHQ9
SUM OF ALL RESPONSES TO PHQ QUESTIONS 1-9: 0
SUM OF ALL RESPONSES TO PHQ QUESTIONS 1-9: 0
2. FEELING DOWN, DEPRESSED OR HOPELESS: 0
SUM OF ALL RESPONSES TO PHQ QUESTIONS 1-9: 0
SUM OF ALL RESPONSES TO PHQ QUESTIONS 1-9: 0
SUM OF ALL RESPONSES TO PHQ9 QUESTIONS 1 & 2: 0
1. LITTLE INTEREST OR PLEASURE IN DOING THINGS: 0

## 2024-03-01 ASSESSMENT — ENCOUNTER SYMPTOMS
DIARRHEA: 0
CONSTIPATION: 1
ABDOMINAL PAIN: 0
BLOOD IN STOOL: 0
NAUSEA: 0
SHORTNESS OF BREATH: 0
COUGH: 0
VOMITING: 0

## 2024-03-01 ASSESSMENT — ANXIETY QUESTIONNAIRES
IF YOU CHECKED OFF ANY PROBLEMS ON THIS QUESTIONNAIRE, HOW DIFFICULT HAVE THESE PROBLEMS MADE IT FOR YOU TO DO YOUR WORK, TAKE CARE OF THINGS AT HOME, OR GET ALONG WITH OTHER PEOPLE: NOT DIFFICULT AT ALL
1. FEELING NERVOUS, ANXIOUS, OR ON EDGE: 0
7. FEELING AFRAID AS IF SOMETHING AWFUL MIGHT HAPPEN: 0
5. BEING SO RESTLESS THAT IT IS HARD TO SIT STILL: 0
3. WORRYING TOO MUCH ABOUT DIFFERENT THINGS: 0
GAD7 TOTAL SCORE: 0
4. TROUBLE RELAXING: 0
6. BECOMING EASILY ANNOYED OR IRRITABLE: 0
2. NOT BEING ABLE TO STOP OR CONTROL WORRYING: 0

## 2024-03-01 ASSESSMENT — LIFESTYLE VARIABLES
HOW OFTEN DO YOU HAVE A DRINK CONTAINING ALCOHOL: NEVER
HOW MANY STANDARD DRINKS CONTAINING ALCOHOL DO YOU HAVE ON A TYPICAL DAY: PATIENT DOES NOT DRINK

## 2024-03-01 NOTE — TELEPHONE ENCOUNTER
Advised Lucero with [facility] per 's note   father w/ new dx of macular atropy, requesting to see a specialist. Lucero verbalized understanding.

## 2024-03-01 NOTE — RESULT ENCOUNTER NOTE
Results released to patient via [employer].  All labs are stable or at goal for her.  Na is low, 1st time, will monitor.  Lipids stable.

## 2024-03-01 NOTE — TELEPHONE ENCOUNTER
Anais from [facility] called regarding the referral she received today.  The referral states \"family history of eye disorder\".  She would like clarification on which disorders they should be looking for.    Anais - [phone]

## 2024-03-01 NOTE — PROGRESS NOTES
Akira Buitrago is a 48 y.o. female who was seen in clinic today (3/1/2024) for a full physical.   Patient was seen with Dr La Parmar (R2 at [facility]).      Assessment & Plan:   Below is the assessment and plan developed based on review of pertinent history, physical exam, labs, studies, and medications.    1. Routine physical examination  -     External Referral To Ophthalmology  -     Comprehensive Metabolic Panel; Future  -     CBC; Future  -     Lipid Panel; Future  2. Pure hypercholesterolemia  Assessment & Plan:  previously at goal, continue current treatment pending review of labs    Orders:  -     Comprehensive Metabolic Panel; Future  -     Lipid Panel; Future  3. Anxiety  Assessment & Plan:  well controlled and improved, continue current treatment, [facility]  Orders:  -     ALPRAZolam (XANAX) 0.5 MG tablet; Take 0.5-1 tablets by mouth 2 times daily as needed for Anxiety. Max Daily Amount: 1 mg, Disp-5 tablet, R-0Normal  4. Subacute thyroiditis  Assessment & Plan:  Asymptomatic. Monitored by specialist- no acute findings meriting change in the plan  5. POTS (postural orthostatic tachycardia syndrome)  Assessment & Plan:  Intermittent, she will drink high sodium shake when needed. Will monitor   6. Family history of eye disorder  Comments:  father w/ new dx of macular atropy, requesting to see his specialist  Orders:  -     External Referral To Ophthalmology     Return in about 1 year (around 3/1/2025) for FULL PHYSICAL.   Subjective:   Akira is here today for a full physical.    Since last visit: weight has increased, she plans to make life style changes (up 3 lbs from last year and 10+ lbs in the last several years).    Health Maintenance  Immunizations:   Covid: declined  Influenza: up to date   Tetanus: up to date    Cancer screening:   Cervical: reviewed guidelines, up to date.  Breast: reviewed guidelines, up to date.   Colon: reviewed guidelines, up to date      The following acute and/or chronic

## 2024-03-15 RX ORDER — ATORVASTATIN CALCIUM 10 MG/1
10 TABLET, FILM COATED ORAL DAILY
Qty: 90 TABLET | Refills: 3 | OUTPATIENT
Start: 2024-03-15

## 2025-03-07 ENCOUNTER — OFFICE VISIT (OUTPATIENT)
Age: 50
End: 2025-03-07
Payer: COMMERCIAL

## 2025-03-07 VITALS
DIASTOLIC BLOOD PRESSURE: 60 MMHG | SYSTOLIC BLOOD PRESSURE: 94 MMHG | HEART RATE: 73 BPM | OXYGEN SATURATION: 97 % | BODY MASS INDEX: 25.98 KG/M2 | TEMPERATURE: 98.1 F | WEIGHT: 141.2 LBS | RESPIRATION RATE: 16 BRPM | HEIGHT: 62 IN

## 2025-03-07 DIAGNOSIS — E78.00 PURE HYPERCHOLESTEROLEMIA: ICD-10-CM

## 2025-03-07 DIAGNOSIS — Z00.00 ROUTINE PHYSICAL EXAMINATION: ICD-10-CM

## 2025-03-07 DIAGNOSIS — F41.9 ANXIETY: ICD-10-CM

## 2025-03-07 DIAGNOSIS — Z00.00 ROUTINE PHYSICAL EXAMINATION: Primary | ICD-10-CM

## 2025-03-07 DIAGNOSIS — E06.1 SUBACUTE THYROIDITIS: ICD-10-CM

## 2025-03-07 DIAGNOSIS — G90.A POTS (POSTURAL ORTHOSTATIC TACHYCARDIA SYNDROME): ICD-10-CM

## 2025-03-07 LAB
ALBUMIN SERPL-MCNC: 3.7 G/DL (ref 3.5–5)
ALBUMIN/GLOB SERPL: 1.1 (ref 1.1–2.2)
ALP SERPL-CCNC: 57 U/L (ref 45–117)
ALT SERPL-CCNC: 37 U/L (ref 12–78)
ANION GAP SERPL CALC-SCNC: 7 MMOL/L (ref 2–12)
AST SERPL-CCNC: 23 U/L (ref 15–37)
BILIRUB SERPL-MCNC: 0.8 MG/DL (ref 0.2–1)
BUN SERPL-MCNC: 9 MG/DL (ref 6–20)
BUN/CREAT SERPL: 16 (ref 12–20)
CALCIUM SERPL-MCNC: 9.1 MG/DL (ref 8.5–10.1)
CHLORIDE SERPL-SCNC: 102 MMOL/L (ref 97–108)
CHOLEST SERPL-MCNC: 141 MG/DL
CO2 SERPL-SCNC: 27 MMOL/L (ref 21–32)
CREAT SERPL-MCNC: 0.58 MG/DL (ref 0.55–1.02)
ERYTHROCYTE [DISTWIDTH] IN BLOOD BY AUTOMATED COUNT: 12.4 % (ref 11.5–14.5)
GLOBULIN SER CALC-MCNC: 3.3 G/DL (ref 2–4)
GLUCOSE SERPL-MCNC: 88 MG/DL (ref 65–100)
HCT VFR BLD AUTO: 35 % (ref 35–47)
HDLC SERPL-MCNC: 62 MG/DL
HDLC SERPL: 2.3 (ref 0–5)
HGB BLD-MCNC: 11.8 G/DL (ref 11.5–16)
LDLC SERPL CALC-MCNC: 71 MG/DL (ref 0–100)
MCH RBC QN AUTO: 30.3 PG (ref 26–34)
MCHC RBC AUTO-ENTMCNC: 33.7 G/DL (ref 30–36.5)
MCV RBC AUTO: 90 FL (ref 80–99)
NRBC # BLD: 0 K/UL (ref 0–0.01)
NRBC BLD-RTO: 0 PER 100 WBC
PLATELET # BLD AUTO: 277 K/UL (ref 150–400)
PMV BLD AUTO: 10.7 FL (ref 8.9–12.9)
POTASSIUM SERPL-SCNC: 4 MMOL/L (ref 3.5–5.1)
PROT SERPL-MCNC: 7 G/DL (ref 6.4–8.2)
RBC # BLD AUTO: 3.89 M/UL (ref 3.8–5.2)
SODIUM SERPL-SCNC: 136 MMOL/L (ref 136–145)
TRIGL SERPL-MCNC: 40 MG/DL
VLDLC SERPL CALC-MCNC: 8 MG/DL
WBC # BLD AUTO: 5.5 K/UL (ref 3.6–11)

## 2025-03-07 PROCEDURE — 99396 PREV VISIT EST AGE 40-64: CPT | Performed by: INTERNAL MEDICINE

## 2025-03-07 RX ORDER — ALPRAZOLAM 0.5 MG
.25-.5 TABLET ORAL 2 TIMES DAILY PRN
Qty: 5 TABLET | Refills: 0 | Status: SHIPPED | OUTPATIENT
Start: 2025-03-07 | End: 2026-03-07

## 2025-03-07 SDOH — ECONOMIC STABILITY: FOOD INSECURITY: WITHIN THE PAST 12 MONTHS, THE FOOD YOU BOUGHT JUST DIDN'T LAST AND YOU DIDN'T HAVE MONEY TO GET MORE.: NEVER TRUE

## 2025-03-07 SDOH — ECONOMIC STABILITY: FOOD INSECURITY: WITHIN THE PAST 12 MONTHS, YOU WORRIED THAT YOUR FOOD WOULD RUN OUT BEFORE YOU GOT MONEY TO BUY MORE.: NEVER TRUE

## 2025-03-07 ASSESSMENT — ENCOUNTER SYMPTOMS
ABDOMINAL PAIN: 0
CONSTIPATION: 0
COUGH: 0
SHORTNESS OF BREATH: 0
BLOOD IN STOOL: 0
DIARRHEA: 0
VOMITING: 0
NAUSEA: 0

## 2025-03-07 ASSESSMENT — LIFESTYLE VARIABLES
HOW OFTEN DO YOU HAVE A DRINK CONTAINING ALCOHOL: MONTHLY OR LESS
HOW MANY STANDARD DRINKS CONTAINING ALCOHOL DO YOU HAVE ON A TYPICAL DAY: 1 OR 2

## 2025-03-07 ASSESSMENT — PATIENT HEALTH QUESTIONNAIRE - PHQ9
1. LITTLE INTEREST OR PLEASURE IN DOING THINGS: NOT AT ALL
2. FEELING DOWN, DEPRESSED OR HOPELESS: NOT AT ALL
SUM OF ALL RESPONSES TO PHQ QUESTIONS 1-9: 0

## 2025-03-07 NOTE — PATIENT INSTRUCTIONS
hands, brush your teeth twice a day, and wear a seat belt in the car.   Where can you learn more?  Go to https://www.[employer].net/patientEd and enter P072 to learn more about \"Well Visit, Ages 18 to 65: Care Instructions.\"  Current as of: April 30, 2024  Content Version: 14.3  © 2024 [employer].   Care instructions adapted under license by [email or web address]. If you have questions about a medical condition or this instruction, always ask your healthcare professional. [employer] disclaims any warranty or liability for your use of this information.

## 2025-03-07 NOTE — PROGRESS NOTES
issues.  She reports taking medications as instructed, no medication side effects noted.  She generally follows a low fat low cholesterol diet.      Mental Health Review  Patient is seen for anxiety.  Son recently admitted for appendicitis. She reports her new job is helping.  Available GAD7 reviewed.  Reports experiences the following side effects from the treatment: none.  [facility].  Xanax 0.5mg, 3 tabs, filled on 3/1/24.  Previously 5 tabs provided on 2/17/23.      The following sections were reviewed & updated as appropriate: Problem List, Allergies, PMH, PSH, FH, and SH.    Prior to Admission medications    Medication Sig Start Date End Date Taking? Authorizing Provider   EVENING PRIMROSE OIL PO Take by mouth daily   Yes ProviderCoby MD   atorvastatin (LIPITOR) 10 MG tablet Take 1 tablet by mouth daily 3/1/24  Yes Jamaal Healy MD   ascorbic acid (VITAMIN C) 500 MG tablet Take 1 tablet by mouth daily   Yes Automatic Reconciliation, Ar   vitamin D (CHOLECALCIFEROL) 25 MCG (1000 UT) TABS tablet Take 1 tablet by mouth daily   Yes Automatic Reconciliation, Ar   Coenzyme Q10 10 MG CAPS Take by mouth   Yes Automatic Reconciliation, Ar        Review of Systems   Constitutional:  Negative for chills, fatigue and fever.   Respiratory:  Negative for cough and shortness of breath.    Cardiovascular:  Negative for chest pain and palpitations.   Gastrointestinal:  Negative for abdominal pain, blood in stool, constipation, diarrhea, nausea and vomiting.   Musculoskeletal:  Negative for arthralgias and myalgias.   Neurological:  Negative for dizziness, numbness and headaches.   Hematological:  Does not bruise/bleed easily.   Psychiatric/Behavioral:  Negative for dysphoric mood and sleep disturbance. The patient is not nervous/anxious.        Objective:   Physical Exam  Constitutional:       General: She is not in acute distress.  HENT:      Right Ear: Tympanic membrane and ear canal normal.      Left

## 2025-03-07 NOTE — ASSESSMENT & PLAN NOTE
Asymptomatic. She has follow up with Dr Muñoz this year. Monitored by specialist- no acute findings meriting change in the plan

## 2025-03-07 NOTE — ASSESSMENT & PLAN NOTE
well controlled and improved, continue current treatment, [facility], as long as using xanax infrequently will continue

## 2025-03-09 ENCOUNTER — RESULTS FOLLOW-UP (OUTPATIENT)
Age: 50
End: 2025-03-09

## 2025-03-09 DIAGNOSIS — R71.0 DECREASED HEMOGLOBIN: Primary | ICD-10-CM

## 2025-05-05 LAB — HBA1C MFR BLD HPLC: 5.5 %

## 2025-05-19 RX ORDER — ATORVASTATIN CALCIUM 10 MG/1
10 TABLET, FILM COATED ORAL DAILY
Qty: 90 TABLET | Refills: 3 | Status: SHIPPED | OUTPATIENT
Start: 2025-05-19
